# Patient Record
Sex: MALE | Race: WHITE | NOT HISPANIC OR LATINO | Employment: OTHER | URBAN - NONMETROPOLITAN AREA
[De-identification: names, ages, dates, MRNs, and addresses within clinical notes are randomized per-mention and may not be internally consistent; named-entity substitution may affect disease eponyms.]

---

## 2019-07-18 ENCOUNTER — OFFICE VISIT (OUTPATIENT)
Dept: NEUROSURGERY | Facility: CLINIC | Age: 74
End: 2019-07-18

## 2019-07-18 VITALS
BODY MASS INDEX: 25.31 KG/M2 | OXYGEN SATURATION: 98 % | WEIGHT: 180.8 LBS | DIASTOLIC BLOOD PRESSURE: 72 MMHG | TEMPERATURE: 97 F | RESPIRATION RATE: 18 BRPM | SYSTOLIC BLOOD PRESSURE: 147 MMHG | HEIGHT: 71 IN | HEART RATE: 54 BPM

## 2019-07-18 DIAGNOSIS — M51.36 DEGENERATIVE DISC DISEASE, LUMBAR: Primary | ICD-10-CM

## 2019-07-18 PROCEDURE — 99203 OFFICE O/P NEW LOW 30 MIN: CPT | Performed by: NEUROLOGICAL SURGERY

## 2019-07-18 RX ORDER — NABUMETONE 750 MG/1
750 TABLET, FILM COATED ORAL 2 TIMES DAILY
Qty: 60 TABLET | Refills: 0 | Status: SHIPPED | OUTPATIENT
Start: 2019-07-18 | End: 2022-08-09

## 2019-07-18 RX ORDER — GABAPENTIN 600 MG/1
TABLET ORAL
COMMUNITY
Start: 2019-06-18 | End: 2022-08-09

## 2019-07-18 NOTE — PROGRESS NOTES
Joni SEPULVEDA Lucia  1945  [id number]      Chief Complaint   Patient presents with   • Back Pain   • Leg Pain       HISTORY OF PRESENT ILLNESS: This is a 73-year-old male who presents with a chronic history of severe pain in his back which radiates into his lower extremities more so on the left than the right.  Pain is persistent.  He has not been to physical therapy.  In the past he has been to pain management; received injections which have not benefit.    A lumbar MRI was performed is referred for neurosurgical consultation.    Past Medical History:   Diagnosis Date   • Hypertension        Past Surgical History:   Procedure Laterality Date   • PROSTATE BIOPSY     • STOMACH SURGERY         Family History   Problem Relation Age of Onset   • Lung cancer Father        Social History     Socioeconomic History   • Marital status: Unknown     Spouse name: Not on file   • Number of children: Not on file   • Years of education: Not on file   • Highest education level: Not on file   Tobacco Use   • Smoking status: Current Every Day Smoker     Types: Cigarettes   • Smokeless tobacco: Never Used   Substance and Sexual Activity   • Alcohol use: Yes   • Drug use: No       Allergies   Allergen Reactions   • No Known Drug Allergy          Current Outpatient Medications:   •  allopurinol (ZYLOPRIM) 300 MG tablet, , Disp: , Rfl:   •  ALPRAZolam (XANAX) 0.5 MG tablet, Take one tablet 30 minutes prior to procedure - bring medication to the office day of procedure., Disp: 3 tablet, Rfl: 0  •  diazepam (VALIUM) 5 MG tablet, , Disp: , Rfl:   •  Hydrocodone-Acetaminophen (LORCET PLUS) 7.5-650 MG per tablet, Take  by mouth 3 (three) times a day., Disp: , Rfl:   •  lisinopril (PRINIVIL,ZESTRIL) 30 MG tablet, Take  by mouth daily., Disp: , Rfl:   •  metoprolol tartrate (LOPRESSOR) 50 MG tablet, , Disp: , Rfl:   •  nystatin-triamcinolone (MYCOLOG) 766684-2.1 UNIT/GM-% ointment, Apply  topically 2 (two) times a day., Disp: 30 g, Rfl: 0  •   ranitidine (ZANTAC) 150 MG tablet, , Disp: , Rfl:   •  zolpidem (AMBIEN) 5 MG tablet, Take  by mouth., Disp: , Rfl:     Current Facility-Administered Medications:   •  cefTRIAXone (ROCEPHIN) injection 1 g, 1 g, Intramuscular, Q24H, Talha Golden MD    Review of Systems   Constitutional: Negative for activity change, appetite change, chills, diaphoresis, fatigue, fever and unexpected weight change.   HENT: Positive for sinus pressure and trouble swallowing. Negative for congestion, dental problem, drooling, ear discharge, ear pain, facial swelling, hearing loss, mouth sores, nosebleeds, postnasal drip, rhinorrhea, sinus pain, sneezing, sore throat, tinnitus and voice change.    Eyes: Positive for itching. Negative for photophobia, pain, discharge and redness.   Respiratory: Negative for apnea, cough, choking, chest tightness, shortness of breath, wheezing and stridor.    Cardiovascular: Positive for palpitations. Negative for chest pain and leg swelling.   Gastrointestinal: Negative for abdominal distention, abdominal pain, anal bleeding, blood in stool, constipation, diarrhea, nausea, rectal pain and vomiting.   Endocrine: Negative for cold intolerance, heat intolerance, polydipsia, polyphagia and polyuria.   Genitourinary: Negative for decreased urine volume, difficulty urinating, discharge, dysuria, enuresis, flank pain, frequency, genital sores, hematuria, penile pain, penile swelling, scrotal swelling, testicular pain and urgency.   Musculoskeletal: Positive for arthralgias, back pain, gait problem, joint swelling, myalgias, neck pain and neck stiffness.   Skin: Negative for color change, pallor, rash and wound.   Allergic/Immunologic: Positive for environmental allergies. Negative for food allergies and immunocompromised state.   Neurological: Positive for numbness. Negative for dizziness, tremors, seizures, syncope, facial asymmetry, speech difficulty, weakness, light-headedness and headaches.  "  Hematological: Negative for adenopathy. Does not bruise/bleed easily.   Psychiatric/Behavioral: Negative for agitation, behavioral problems, confusion, decreased concentration, dysphoric mood, hallucinations, self-injury, sleep disturbance and suicidal ideas. The patient is not nervous/anxious and is not hyperactive.        Vitals:    07/18/19 1233   BP: 147/72   BP Location: Right arm   Patient Position: Sitting   Cuff Size: Adult   Pulse: 54   Resp: 18   Temp: 97 °F (36.1 °C)   TempSrc: Oral   SpO2: 98%   Weight: 82 kg (180 lb 12.8 oz)   Height: 180.3 cm (71\")       Neurological Examination:    Mental status/speech: The patient is alert and oriented.  Speech is clear without aphysia or dysarthria.  No overt cognitive deficits.    Cranial nerve examination:    Olfaction: Smell is intact.  Vision: Vision is intact without visual field abnormalities.  Funduscopic examination is normal.  No pupillary irregularity.  Ocular motor examination: The extraocular muscles are intact.  There is no diplopia.  The pupil is round and reactive to both light and accommodation.  There is no nystagmus.  Facial movement/sensation: There is no facial weakness.  Sensation is intact in the first, second, and third divisions of the trigeminal nerve.  The corneal reflex is intact.  Auditory: Hearing is intact to finger rub bilaterally.  Cranial nerves IX, X, XI, XII: Phonation is normal.  No dysphagia.  Tongue is protruded in the midline without atrophy.  The gag reflex is intact.  Shoulder shrug is normal.    Musculoligamentous ligamentous examination: He has limitation range of motion lumbar spine however straight leg raising Lasègue and flip test are all negative.  I find no evidence of weakness sensory loss or reflex asymmetry.  His gait is normal.          Medical Decision Making:     Diagnostic Data Set: I reviewed his lumbar MRI which shows the presence of degenerative osteoarthritic just.  These are chronic and show no evidence " of high-grade spinal or foraminal stenosis.      Assessment: Degenerative osteoarthritis of the lumbar spine          Recommendations: He does not have a condition that can be helped with surgical intervention.  Pain management is his best option.  He has been to [facility] which did not help or provide medication.  I am not familiar with local pain management services other than those that do the injections.        I greatly appreciate the opportunity to see and evaluate this individual.  If you have questions or concerns regarding issues that I may have overlooked please call me at any time: [phone].  Franc Cervantes M.D.  [facility]  [address] [place] [address]  Scribed for Joni Cervantes MD by Koby Duarte CMA. 7/18/2019 12:44 PM     I have read and concur with the information provided by the scribe.  Joni Cervantes MD

## 2020-09-01 ENCOUNTER — HOSPITAL ENCOUNTER (OUTPATIENT)
Dept: GENERAL RADIOLOGY | Facility: HOSPITAL | Age: 75
Discharge: HOME | End: 2020-09-01
Admitting: FAMILY MEDICINE

## 2020-09-01 ENCOUNTER — TRANSCRIBE ORDERS (OUTPATIENT)
Dept: ADMINISTRATIVE | Facility: HOSPITAL | Age: 75
End: 2020-09-01

## 2020-09-01 DIAGNOSIS — M25.522 LEFT ELBOW PAIN: ICD-10-CM

## 2020-09-01 DIAGNOSIS — M25.512 LEFT SHOULDER PAIN, UNSPECIFIED CHRONICITY: Primary | ICD-10-CM

## 2020-09-01 DIAGNOSIS — M25.512 LEFT SHOULDER PAIN, UNSPECIFIED CHRONICITY: ICD-10-CM

## 2020-09-01 PROCEDURE — 73080 X-RAY EXAM OF ELBOW: CPT | Performed by: RADIOLOGY

## 2020-09-01 PROCEDURE — 73080 X-RAY EXAM OF ELBOW: CPT

## 2020-09-01 PROCEDURE — 73030 X-RAY EXAM OF SHOULDER: CPT | Performed by: RADIOLOGY

## 2020-09-01 PROCEDURE — 73030 X-RAY EXAM OF SHOULDER: CPT

## 2020-10-21 ENCOUNTER — TRANSCRIBE ORDERS (OUTPATIENT)
Dept: ADMINISTRATIVE | Facility: HOSPITAL | Age: 75
End: 2020-10-21

## 2020-10-21 DIAGNOSIS — R13.10 PROBLEMS WITH SWALLOWING AND MASTICATION: Primary | ICD-10-CM

## 2020-10-23 ENCOUNTER — TRANSCRIBE ORDERS (OUTPATIENT)
Dept: ADMINISTRATIVE | Facility: HOSPITAL | Age: 75
End: 2020-10-23

## 2020-10-23 DIAGNOSIS — Z01.818 PRE-OPERATIVE CLEARANCE: Primary | ICD-10-CM

## 2020-10-27 ENCOUNTER — LAB (OUTPATIENT)
Dept: LAB | Facility: HOSPITAL | Age: 75
End: 2020-10-27

## 2020-10-27 DIAGNOSIS — Z01.818 PRE-OPERATIVE CLEARANCE: ICD-10-CM

## 2020-10-27 PROCEDURE — C9803 HOPD COVID-19 SPEC COLLECT: HCPCS

## 2020-10-27 PROCEDURE — U0004 COV-19 TEST NON-CDC HGH THRU: HCPCS | Performed by: RADIOLOGY

## 2020-10-28 LAB — SARS-COV-2 RNA RESP QL NAA+PROBE: NOT DETECTED

## 2020-10-29 ENCOUNTER — HOSPITAL ENCOUNTER (OUTPATIENT)
Dept: GENERAL RADIOLOGY | Facility: HOSPITAL | Age: 75
Discharge: HOME | End: 2020-10-29
Admitting: NURSE PRACTITIONER

## 2020-10-29 DIAGNOSIS — R13.10 PROBLEMS WITH SWALLOWING AND MASTICATION: ICD-10-CM

## 2020-10-29 PROCEDURE — 74220 X-RAY XM ESOPHAGUS 1CNTRST: CPT

## 2020-10-29 PROCEDURE — 74220 X-RAY XM ESOPHAGUS 1CNTRST: CPT | Performed by: RADIOLOGY

## 2021-01-06 ENCOUNTER — TRANSCRIBE ORDERS (OUTPATIENT)
Dept: ADMINISTRATIVE | Facility: HOSPITAL | Age: 76
End: 2021-01-06

## 2021-01-06 DIAGNOSIS — Z01.818 PRE-OPERATIVE CLEARANCE: Primary | ICD-10-CM

## 2021-01-18 ENCOUNTER — TRANSCRIBE ORDERS (OUTPATIENT)
Dept: ADMINISTRATIVE | Facility: HOSPITAL | Age: 76
End: 2021-01-18

## 2021-01-18 ENCOUNTER — HOSPITAL ENCOUNTER (OUTPATIENT)
Dept: GENERAL RADIOLOGY | Facility: HOSPITAL | Age: 76
Discharge: HOME | End: 2021-01-18
Admitting: PHYSICIAN ASSISTANT

## 2021-01-18 DIAGNOSIS — M79.672 LEFT FOOT PAIN: ICD-10-CM

## 2021-01-18 DIAGNOSIS — M79.671 RIGHT FOOT PAIN: Primary | ICD-10-CM

## 2021-01-18 DIAGNOSIS — M79.671 RIGHT FOOT PAIN: ICD-10-CM

## 2021-01-18 PROCEDURE — 73630 X-RAY EXAM OF FOOT: CPT | Performed by: RADIOLOGY

## 2021-01-18 PROCEDURE — 73630 X-RAY EXAM OF FOOT: CPT

## 2021-02-25 ENCOUNTER — TRANSCRIBE ORDERS (OUTPATIENT)
Dept: ADMINISTRATIVE | Facility: HOSPITAL | Age: 76
End: 2021-02-25

## 2021-02-25 DIAGNOSIS — Z01.818 OTHER SPECIFIED PRE-OPERATIVE EXAMINATION: Primary | ICD-10-CM

## 2021-02-26 ENCOUNTER — LAB (OUTPATIENT)
Dept: LAB | Facility: HOSPITAL | Age: 76
End: 2021-02-26

## 2021-02-26 DIAGNOSIS — Z01.818 OTHER SPECIFIED PRE-OPERATIVE EXAMINATION: ICD-10-CM

## 2021-02-26 PROCEDURE — C9803 HOPD COVID-19 SPEC COLLECT: HCPCS

## 2021-02-26 PROCEDURE — U0004 COV-19 TEST NON-CDC HGH THRU: HCPCS

## 2021-02-27 LAB — SARS-COV-2 RNA RESP QL NAA+PROBE: NOT DETECTED

## 2021-08-18 ENCOUNTER — HOSPITAL ENCOUNTER (OUTPATIENT)
Dept: GENERAL RADIOLOGY | Facility: HOSPITAL | Age: 76
Discharge: HOME | End: 2021-08-18
Admitting: FAMILY MEDICINE

## 2021-08-18 ENCOUNTER — TRANSCRIBE ORDERS (OUTPATIENT)
Dept: ADMINISTRATIVE | Facility: HOSPITAL | Age: 76
End: 2021-08-18

## 2021-08-18 DIAGNOSIS — G62.9 PERIPHERAL NEURITIS: ICD-10-CM

## 2021-08-18 DIAGNOSIS — M54.50 LOW BACK PAIN, UNSPECIFIED BACK PAIN LATERALITY, UNSPECIFIED CHRONICITY, UNSPECIFIED WHETHER SCIATICA PRESENT: ICD-10-CM

## 2021-08-18 DIAGNOSIS — G89.29 OTHER CHRONIC PAIN: ICD-10-CM

## 2021-08-18 DIAGNOSIS — G62.9 PERIPHERAL NEURITIS: Primary | ICD-10-CM

## 2021-08-18 PROCEDURE — 72100 X-RAY EXAM L-S SPINE 2/3 VWS: CPT | Performed by: RADIOLOGY

## 2021-08-18 PROCEDURE — 72100 X-RAY EXAM L-S SPINE 2/3 VWS: CPT

## 2021-08-18 PROCEDURE — 72050 X-RAY EXAM NECK SPINE 4/5VWS: CPT | Performed by: RADIOLOGY

## 2021-08-18 PROCEDURE — 72072 X-RAY EXAM THORAC SPINE 3VWS: CPT

## 2021-08-18 PROCEDURE — 73590 X-RAY EXAM OF LOWER LEG: CPT

## 2021-08-18 PROCEDURE — 73630 X-RAY EXAM OF FOOT: CPT

## 2021-08-18 PROCEDURE — 73590 X-RAY EXAM OF LOWER LEG: CPT | Performed by: RADIOLOGY

## 2021-08-18 PROCEDURE — 72072 X-RAY EXAM THORAC SPINE 3VWS: CPT | Performed by: RADIOLOGY

## 2021-08-18 PROCEDURE — 73630 X-RAY EXAM OF FOOT: CPT | Performed by: RADIOLOGY

## 2021-08-18 PROCEDURE — 72050 X-RAY EXAM NECK SPINE 4/5VWS: CPT

## 2021-10-07 ENCOUNTER — TRANSCRIBE ORDERS (OUTPATIENT)
Dept: ADMINISTRATIVE | Facility: HOSPITAL | Age: 76
End: 2021-10-07

## 2021-10-07 DIAGNOSIS — Z01.818 PRE-OPERATIVE CLEARANCE: Primary | ICD-10-CM

## 2021-10-11 ENCOUNTER — LAB (OUTPATIENT)
Dept: LAB | Facility: HOSPITAL | Age: 76
End: 2021-10-11

## 2021-10-11 DIAGNOSIS — Z01.818 PRE-OPERATIVE CLEARANCE: ICD-10-CM

## 2021-10-11 PROCEDURE — U0004 COV-19 TEST NON-CDC HGH THRU: HCPCS

## 2021-10-11 PROCEDURE — C9803 HOPD COVID-19 SPEC COLLECT: HCPCS

## 2021-10-12 LAB — SARS-COV-2 RNA PNL SPEC NAA+PROBE: NOT DETECTED

## 2021-10-22 ENCOUNTER — TRANSCRIBE ORDERS (OUTPATIENT)
Dept: ADMINISTRATIVE | Facility: HOSPITAL | Age: 76
End: 2021-10-22

## 2021-10-22 DIAGNOSIS — Z01.818 PRE-OPERATIVE CLEARANCE: Primary | ICD-10-CM

## 2021-10-25 ENCOUNTER — LAB (OUTPATIENT)
Dept: LAB | Facility: HOSPITAL | Age: 76
End: 2021-10-25

## 2021-10-25 DIAGNOSIS — Z01.818 PRE-OPERATIVE CLEARANCE: ICD-10-CM

## 2021-10-25 LAB — SARS-COV-2 RNA PNL SPEC NAA+PROBE: NOT DETECTED

## 2021-10-25 PROCEDURE — C9803 HOPD COVID-19 SPEC COLLECT: HCPCS

## 2021-10-25 PROCEDURE — U0004 COV-19 TEST NON-CDC HGH THRU: HCPCS | Performed by: OPHTHALMOLOGY

## 2021-11-10 ENCOUNTER — TRANSCRIBE ORDERS (OUTPATIENT)
Dept: ADMINISTRATIVE | Facility: HOSPITAL | Age: 76
End: 2021-11-10

## 2021-11-10 DIAGNOSIS — Z01.818 PRE-OPERATIVE CLEARANCE: Primary | ICD-10-CM

## 2022-01-26 ENCOUNTER — TRANSCRIBE ORDERS (OUTPATIENT)
Dept: ADMINISTRATIVE | Facility: HOSPITAL | Age: 77
End: 2022-01-26

## 2022-01-26 DIAGNOSIS — M54.9 OTHER ACUTE BACK PAIN: Primary | ICD-10-CM

## 2022-01-26 DIAGNOSIS — G89.29 OTHER CHRONIC PAIN: ICD-10-CM

## 2022-02-09 ENCOUNTER — HOSPITAL ENCOUNTER (OUTPATIENT)
Dept: MRI IMAGING | Facility: HOSPITAL | Age: 77
Discharge: HOME | End: 2022-02-09
Admitting: FAMILY MEDICINE

## 2022-02-09 DIAGNOSIS — G89.29 OTHER CHRONIC PAIN: ICD-10-CM

## 2022-02-09 DIAGNOSIS — M54.9 OTHER ACUTE BACK PAIN: ICD-10-CM

## 2022-02-09 PROCEDURE — 72148 MRI LUMBAR SPINE W/O DYE: CPT | Performed by: RADIOLOGY

## 2022-02-09 PROCEDURE — 72148 MRI LUMBAR SPINE W/O DYE: CPT

## 2022-04-01 ENCOUNTER — TRANSCRIBE ORDERS (OUTPATIENT)
Dept: ADMINISTRATIVE | Facility: HOSPITAL | Age: 77
End: 2022-04-01

## 2022-04-01 ENCOUNTER — HOSPITAL ENCOUNTER (OUTPATIENT)
Dept: GENERAL RADIOLOGY | Facility: HOSPITAL | Age: 77
Discharge: HOME | End: 2022-04-01
Admitting: FAMILY MEDICINE

## 2022-04-01 DIAGNOSIS — M25.551 RIGHT HIP PAIN: Primary | ICD-10-CM

## 2022-04-01 DIAGNOSIS — M25.551 RIGHT HIP PAIN: ICD-10-CM

## 2022-04-01 PROCEDURE — 73502 X-RAY EXAM HIP UNI 2-3 VIEWS: CPT

## 2022-04-01 PROCEDURE — 73502 X-RAY EXAM HIP UNI 2-3 VIEWS: CPT | Performed by: RADIOLOGY

## 2022-08-09 ENCOUNTER — OFFICE VISIT (OUTPATIENT)
Dept: NEUROSURGERY | Facility: CLINIC | Age: 77
End: 2022-08-09

## 2022-08-09 VITALS
HEART RATE: 55 BPM | BODY MASS INDEX: 22.32 KG/M2 | DIASTOLIC BLOOD PRESSURE: 74 MMHG | SYSTOLIC BLOOD PRESSURE: 167 MMHG | HEIGHT: 72 IN | RESPIRATION RATE: 16 BRPM | WEIGHT: 164.8 LBS

## 2022-08-09 DIAGNOSIS — R20.2 BILATERAL LEG PARESTHESIA: ICD-10-CM

## 2022-08-09 DIAGNOSIS — G89.29 CHRONIC BILATERAL LOW BACK PAIN WITHOUT SCIATICA: Primary | ICD-10-CM

## 2022-08-09 DIAGNOSIS — M54.50 CHRONIC BILATERAL LOW BACK PAIN WITHOUT SCIATICA: Primary | ICD-10-CM

## 2022-08-09 PROCEDURE — 99204 OFFICE O/P NEW MOD 45 MIN: CPT | Performed by: PHYSICIAN ASSISTANT

## 2022-08-09 RX ORDER — TAMSULOSIN HYDROCHLORIDE 0.4 MG/1
CAPSULE ORAL
COMMUNITY
Start: 2022-03-09

## 2022-08-09 RX ORDER — PANTOPRAZOLE SODIUM 40 MG/1
TABLET, DELAYED RELEASE ORAL
COMMUNITY
Start: 2021-11-10

## 2022-08-09 RX ORDER — VENLAFAXINE 75 MG/1
TABLET ORAL
COMMUNITY
Start: 2022-07-22

## 2022-08-09 RX ORDER — GABAPENTIN 800 MG/1
TABLET ORAL
COMMUNITY
Start: 2022-07-22

## 2022-08-09 RX ORDER — LINACLOTIDE 72 UG/1
CAPSULE, GELATIN COATED ORAL
COMMUNITY
Start: 2022-06-22

## 2022-08-09 RX ORDER — BACLOFEN 10 MG/1
TABLET ORAL
COMMUNITY
Start: 2022-06-22

## 2022-08-09 RX ORDER — HYDROCODONE BITARTRATE AND ACETAMINOPHEN 10; 325 MG/1; MG/1
TABLET ORAL
COMMUNITY
Start: 2022-07-22

## 2022-08-09 RX ORDER — ZOLPIDEM TARTRATE 10 MG/1
TABLET ORAL
COMMUNITY
Start: 2022-07-29

## 2022-08-09 NOTE — PROGRESS NOTES
Patient: Joni Myers  : 1945  Gender: male    Primary Care Provider: Hernan Landeros MD    Requesting Provider:  Hernan Landeros MD  [address]  [facility] [address]  [place] [id number]     Chief Complaint: Low back and bilateral leg pain    History of Present Illness:  This is a 76-year-old gentleman who is seen today for evaluation of low back and bilateral leg pain.  He has seen Dr. Cervantes for similar symptoms in 2019.  Patient notes a longstanding history of pain in his low back as well as into his lower extremities.  He states that his pain is diffuse into his legs bilaterally.  He cannot determine if one side is worse than the other.  He denies any focal weakness or bowel or bladder dysfunction.  He has been through physical therapy in the past which typically makes pain worse.  He notes multiple instances over the years where he is fallen off of elevated surfaces during work which may have precluded his back symptoms.  He is seen today with a lumbar MRI.    Patient's medical history significant for hypertension and current tobacco use.      Past Medical and Surgical History:  Past Medical History:   Diagnosis Date   • Hypertension    • Low back pain      Past Surgical History:   Procedure Laterality Date   • PROSTATE BIOPSY     • STOMACH SURGERY         Current Medications:    Current Outpatient Medications:   •  apixaban (ELIQUIS) 5 MG tablet tablet, Eliquis 5 mg tablet  TAKE 1 TABLET BY MOUTH TWICE A DAY BY ORAL ROUTE FOR 30 DAYS., Disp: , Rfl:   •  baclofen (LIORESAL) 10 MG tablet, , Disp: , Rfl:   •  gabapentin (NEURONTIN) 800 MG tablet, , Disp: , Rfl:   •  Hydrocodone-Acetaminophen (LORCET PLUS) 7.5-650 MG per tablet, Take  by mouth 3 (three) times a day., Disp: , Rfl:   •  HYDROcodone-acetaminophen (NORCO)  MG per tablet, , Disp: , Rfl:   •  Linzess 72 MCG capsule capsule, , Disp: , Rfl:   •  metoprolol tartrate (LOPRESSOR) 25 MG tablet, metoprolol tartrate 25 mg tablet  TAKE  1 TABLET (25 MG) BY ORAL ROUTE 2 TIMES PER DAY FOR 30 DAYS *STOP METOPROLOL 50MG*, Disp: , Rfl:   •  pantoprazole (PROTONIX) 40 MG EC tablet, pantoprazole 40 mg tablet,delayed release  take 1 tablet (40 mg) by oral route once daily for 30 days, Disp: , Rfl:   •  tamsulosin (FLOMAX) 0.4 MG capsule 24 hr capsule, tamsulosin 0.4 mg capsule  TAKE 2 CAPSULES BY ORAL ROUTE DAILY FOR 30 DAYS, Disp: , Rfl:   •  venlafaxine (EFFEXOR) 75 MG tablet, , Disp: , Rfl:   •  zolpidem (AMBIEN) 10 MG tablet, , Disp: , Rfl:   •  zolpidem (AMBIEN) 5 MG tablet, Take  by mouth., Disp: , Rfl:     Current Facility-Administered Medications:   •  cefTRIAXone (ROCEPHIN) injection 1 g, 1 g, Intramuscular, Q24H, Talha Golden MD    Allergies:  Allergies   Allergen Reactions   • Aspirin Unknown - High Severity         Review of Systems   Musculoskeletal: Positive for back pain.         Physical Exam  Constitutional:       Appearance: Normal appearance.   HENT:      Head: Normocephalic and atraumatic.   Musculoskeletal:         General: Normal range of motion.      Cervical back: Normal range of motion and neck supple.   Skin:     General: Skin is warm and dry.   Neurological:      Mental Status: He is alert and oriented to person, place, and time.      Sensory: Sensation is intact.      Motor: Motor function is intact.      Coordination: Coordination is intact.      Gait: Gait is intact.      Deep Tendon Reflexes:      Reflex Scores:       Bicep reflexes are 1+ on the right side and 1+ on the left side.       Brachioradialis reflexes are 1+ on the right side and 1+ on the left side.       Patellar reflexes are 2+ on the right side and 2+ on the left side.       Achilles reflexes are 1+ on the right side and 1+ on the left side.     Comments: Cony sign is negative bilaterally  No clonus elicited at the ankle  His gait is mildly antalgic   Psychiatric:         Mood and Affect: Mood normal.         Behavior: Behavior normal.  "          Vitals:    08/09/22 1404   BP: 167/74   BP Location: Left arm   Patient Position: Sitting   Cuff Size: Adult   Pulse: 55   Resp: 16   Weight: 74.8 kg (164 lb 12.8 oz)   Height: 181.6 cm (71.5\")       BMI is within normal parameters. No other follow-up for BMI required.    Independent Review of Diagnostic Imaging:  MRI of the lumbar spine performed 2/9/2022 demonstrates a chronic compression deformity of L1.  There is diffuse disc degeneration as well as facet arthropathy.  There is high-grade foraminal stenosis at multiple levels there is no high-grade central canal stenosis observed.    Assessment:  1.  Chronic low back pain  2.  Bilateral leg pain    Plan:  This is a 76-year-old gentleman who is seen today for evaluation of low back and bilateral leg pain.  His MRI demonstrates diffuse disc degeneration and facet arthropathy.  There is no high-grade canal stenosis however there are several levels of foraminal narrowing.  Historically he has not done very well with physical therapy.  I am going to send him to pain management for consideration of lumbar injections.  I explained to the patient that there is really nothing focal to jump to surgically at this time.  I will see him back in a couple of months after injections.        Gabi Dejesus PA-C  "

## 2023-02-06 ENCOUNTER — TRANSCRIBE ORDERS (OUTPATIENT)
Dept: ADMINISTRATIVE | Facility: HOSPITAL | Age: 78
End: 2023-02-06
Payer: MEDICARE

## 2023-02-06 DIAGNOSIS — M25.562 LEFT KNEE PAIN, UNSPECIFIED CHRONICITY: Primary | ICD-10-CM

## 2023-03-08 ENCOUNTER — HOSPITAL ENCOUNTER (OUTPATIENT)
Dept: MRI IMAGING | Facility: HOSPITAL | Age: 78
Discharge: HOME | End: 2023-03-08
Payer: MEDICARE

## 2023-03-08 ENCOUNTER — HOSPITAL ENCOUNTER (OUTPATIENT)
Dept: GENERAL RADIOLOGY | Facility: HOSPITAL | Age: 78
Discharge: HOME | End: 2023-03-08
Payer: MEDICARE

## 2023-03-08 ENCOUNTER — TRANSCRIBE ORDERS (OUTPATIENT)
Dept: ADMINISTRATIVE | Facility: HOSPITAL | Age: 78
End: 2023-03-08
Payer: MEDICARE

## 2023-03-08 DIAGNOSIS — M25.562 LEFT KNEE PAIN, UNSPECIFIED CHRONICITY: ICD-10-CM

## 2023-03-08 DIAGNOSIS — M25.562 LEFT KNEE PAIN, UNSPECIFIED CHRONICITY: Primary | ICD-10-CM

## 2023-03-08 PROCEDURE — 73560 X-RAY EXAM OF KNEE 1 OR 2: CPT

## 2023-03-08 PROCEDURE — 73721 MRI JNT OF LWR EXTRE W/O DYE: CPT

## 2023-03-08 PROCEDURE — 73721 MRI JNT OF LWR EXTRE W/O DYE: CPT | Performed by: RADIOLOGY

## 2023-03-08 PROCEDURE — 73560 X-RAY EXAM OF KNEE 1 OR 2: CPT | Performed by: RADIOLOGY

## 2024-04-02 ENCOUNTER — TRANSCRIBE ORDERS (OUTPATIENT)
Dept: ADMINISTRATIVE | Facility: HOSPITAL | Age: 79
End: 2024-04-02
Payer: MEDICARE

## 2024-04-02 DIAGNOSIS — M25.561 RIGHT KNEE PAIN, UNSPECIFIED CHRONICITY: Primary | ICD-10-CM

## 2024-04-02 DIAGNOSIS — M25.562 LEFT KNEE PAIN, UNSPECIFIED CHRONICITY: ICD-10-CM

## 2024-04-04 ENCOUNTER — HOSPITAL ENCOUNTER (OUTPATIENT)
Dept: GENERAL RADIOLOGY | Facility: HOSPITAL | Age: 79
Discharge: HOME | End: 2024-04-04
Payer: MEDICARE

## 2024-04-04 ENCOUNTER — TRANSCRIBE ORDERS (OUTPATIENT)
Dept: ADMINISTRATIVE | Facility: HOSPITAL | Age: 79
End: 2024-04-04
Payer: MEDICARE

## 2024-04-04 DIAGNOSIS — M25.562 LEFT KNEE PAIN, UNSPECIFIED CHRONICITY: ICD-10-CM

## 2024-04-04 DIAGNOSIS — M25.561 RIGHT KNEE PAIN, UNSPECIFIED CHRONICITY: ICD-10-CM

## 2024-04-04 DIAGNOSIS — M25.561 RIGHT KNEE PAIN, UNSPECIFIED CHRONICITY: Primary | ICD-10-CM

## 2024-04-04 PROCEDURE — 73562 X-RAY EXAM OF KNEE 3: CPT

## 2024-06-07 ENCOUNTER — OFFICE VISIT (OUTPATIENT)
Dept: UROLOGY | Facility: CLINIC | Age: 79
End: 2024-06-07
Payer: MEDICARE

## 2024-06-07 VITALS
SYSTOLIC BLOOD PRESSURE: 142 MMHG | HEART RATE: 68 BPM | WEIGHT: 154 LBS | HEIGHT: 71 IN | BODY MASS INDEX: 21.56 KG/M2 | DIASTOLIC BLOOD PRESSURE: 72 MMHG

## 2024-06-07 DIAGNOSIS — R97.20 ELEVATED PROSTATE SPECIFIC ANTIGEN (PSA): Primary | ICD-10-CM

## 2024-06-07 DIAGNOSIS — R30.0 DYSURIA: ICD-10-CM

## 2024-06-07 LAB
BILIRUB BLD-MCNC: NEGATIVE MG/DL
BUN SERPL-MCNC: 9 MG/DL (ref 8–23)
BUN/CREAT SERPL: 10.7 (ref 7–25)
CALCIUM SERPL-MCNC: 9.3 MG/DL (ref 8.6–10.5)
CHLORIDE SERPL-SCNC: 103 MMOL/L (ref 98–107)
CLARITY, POC: CLEAR
CO2 SERPL-SCNC: 25.8 MMOL/L (ref 22–29)
COLOR UR: YELLOW
CREAT SERPL-MCNC: 0.84 MG/DL (ref 0.76–1.27)
EGFRCR SERPLBLD CKD-EPI 2021: 89.8 ML/MIN/1.73
EXPIRATION DATE: ABNORMAL
GLUCOSE SERPL-MCNC: 96 MG/DL (ref 65–99)
GLUCOSE UR STRIP-MCNC: NEGATIVE MG/DL
KETONES UR QL: NEGATIVE
LEUKOCYTE EST, POC: NEGATIVE
Lab: ABNORMAL
NITRITE UR-MCNC: NEGATIVE MG/ML
PH UR: 6 [PH] (ref 5–8)
POTASSIUM SERPL-SCNC: 4.1 MMOL/L (ref 3.5–5.2)
PROT UR STRIP-MCNC: NEGATIVE MG/DL
RBC # UR STRIP: ABNORMAL /UL
SODIUM SERPL-SCNC: 140 MMOL/L (ref 136–145)
SP GR UR: 1.02 (ref 1–1.03)
UROBILINOGEN UR QL: NORMAL

## 2024-06-07 RX ORDER — ALFUZOSIN HYDROCHLORIDE 10 MG/1
10 TABLET, EXTENDED RELEASE ORAL DAILY
Qty: 30 TABLET | Refills: 5 | Status: SHIPPED | OUTPATIENT
Start: 2024-06-07

## 2024-06-07 RX ORDER — SULFAMETHOXAZOLE AND TRIMETHOPRIM 800; 160 MG/1; MG/1
1 TABLET ORAL EVERY 12 HOURS
Qty: 28 TABLET | Refills: 0 | Status: SHIPPED | OUTPATIENT
Start: 2024-06-07

## 2024-06-07 NOTE — PROGRESS NOTES
"Chief Complaint:    Chief Complaint   Patient presents with    Elevated PSA       Vital Signs:   /72 (BP Location: Left arm, Patient Position: Sitting)   Pulse 68   Ht 181.6 cm (71.5\")   Wt 69.9 kg (154 lb)   BMI 21.18 kg/m²   Body mass index is 21.18 kg/m².      HPI:  Joni Myers is a 78 y.o. male who presents today for initial evaluation     History of Present Illness  Mr. Myers presents to the clinic for concerns of elevated PSA.  He has been referred to us by THADDEUS Anand.  He does note the patient has been seen previously in office by Dr. Golden for elevated PSA and lower urinary tract symptoms.  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  Rest of samples were benign prostatic tissue only.  Patient was scheduled to follow-up in 3 months however never kept appointment.  He has been on Flomax since with minimal improvement in lower urinary tract symptoms.  He currently has an IPSS score of 18.  He gets up 2 times throughout the night endorses a weak stream, urgency, intermittency, and sensations of incomplete bladder emptying.  He denies any frequency or straining to begin with urination.  He does endorse intermittent dysuria and perineum pain.  He states the symptoms have been intermittently over the past month.  States this is worse with that to be such as riding his lawnmower.  He has tried warm soaks with some improvement.  He had a recent PSA taken by his primary care provider on April 23, 2024 that came back severely elevated at 16.  His free percentage was roughly 6.  This gave him a 50% chance for prostate carcinoma.  He denies any prostate biopsy since 2016.  He denies any family history of known prostate cancer.      Past Medical History:  Past Medical History:   Diagnosis Date    Hypertension     Low back pain  "       Current Meds:  Current Outpatient Medications   Medication Sig Dispense Refill    apixaban (ELIQUIS) 5 MG tablet tablet Eliquis 5 mg tablet   TAKE 1 TABLET BY MOUTH TWICE A DAY BY ORAL ROUTE FOR 30 DAYS.      gabapentin (NEURONTIN) 800 MG tablet       HYDROcodone-acetaminophen (NORCO)  MG per tablet       Linzess 72 MCG capsule capsule       metoprolol tartrate (LOPRESSOR) 25 MG tablet metoprolol tartrate 25 mg tablet   TAKE 1 TABLET (25 MG) BY ORAL ROUTE 2 TIMES PER DAY FOR 30 DAYS *STOP METOPROLOL 50MG*      pantoprazole (PROTONIX) 40 MG EC tablet pantoprazole 40 mg tablet,delayed release   take 1 tablet (40 mg) by oral route once daily for 30 days      venlafaxine (EFFEXOR) 75 MG tablet       zolpidem (AMBIEN) 10 MG tablet       alfuzosin (UROXATRAL) 10 MG 24 hr tablet Take 1 tablet by mouth Daily. 30 tablet 5    baclofen (LIORESAL) 10 MG tablet  (Patient not taking: Reported on 6/7/2024)      Hydrocodone-Acetaminophen (LORCET PLUS) 7.5-650 MG per tablet Take  by mouth 3 (three) times a day. (Patient not taking: Reported on 6/7/2024)      sulfamethoxazole-trimethoprim (Bactrim DS) 800-160 MG per tablet Take 1 tablet by mouth Every 12 (Twelve) Hours. 28 tablet 0    zolpidem (AMBIEN) 5 MG tablet Take  by mouth. (Patient not taking: Reported on 6/7/2024)       Current Facility-Administered Medications   Medication Dose Route Frequency Provider Last Rate Last Admin    cefTRIAXone (ROCEPHIN) injection 1 g  1 g Intramuscular Q24H Talha Golden MD            Allergies:   Allergies   Allergen Reactions    Aspirin Unknown - High Severity        Past Surgical History:  Past Surgical History:   Procedure Laterality Date    PROSTATE BIOPSY      STOMACH SURGERY         Social History:  Social History     Socioeconomic History    Marital status:    Tobacco Use    Smoking status: Every Day     Types: Cigarettes    Smokeless tobacco: Never   Vaping Use    Vaping status: Never Used   Substance and  Sexual Activity    Alcohol use: Yes    Drug use: No       Family History:  Family History   Problem Relation Age of Onset    Lung cancer Father        Review of Systems:  Review of Systems   Constitutional:  Negative for chills, fatigue, fever and unexpected weight change.   Respiratory:  Negative for cough, chest tightness, shortness of breath and wheezing.    Cardiovascular:  Negative for chest pain and leg swelling.   Gastrointestinal:  Negative for abdominal pain, constipation, diarrhea, nausea and vomiting.   Genitourinary:  Positive for difficulty urinating, dysuria, frequency and urgency. Negative for penile pain, penile swelling, scrotal swelling and testicular pain.   Musculoskeletal:  Negative for back pain and joint swelling.   Skin:  Negative for rash.   Neurological:  Negative for dizziness and headaches.   Psychiatric/Behavioral:  Negative for confusion and suicidal ideas.        Physical Exam:  Physical Exam  Constitutional:       General: He is not in acute distress.     Appearance: Normal appearance.   HENT:      Head: Normocephalic and atraumatic.      Nose: Nose normal.      Mouth/Throat:      Mouth: Mucous membranes are moist.   Eyes:      Conjunctiva/sclera: Conjunctivae normal.   Cardiovascular:      Rate and Rhythm: Normal rate and regular rhythm.      Pulses: Normal pulses.      Heart sounds: Normal heart sounds.   Pulmonary:      Effort: Pulmonary effort is normal.      Breath sounds: Normal breath sounds.   Abdominal:      General: Bowel sounds are normal.      Palpations: Abdomen is soft.   Genitourinary:     Comments: Enlarged prostate with slight hardness to palpation of the right side of prostate.  Normal left prostate.  No significant nodules noted.  Musculoskeletal:         General: Normal range of motion.      Cervical back: Normal range of motion.   Skin:     General: Skin is warm.   Neurological:      General: No focal deficit present.      Mental Status: He is alert and oriented  to person, place, and time.   Psychiatric:         Mood and Affect: Mood normal.         Behavior: Behavior normal.         Thought Content: Thought content normal.         Judgment: Judgment normal.         IPSS Questionnaire (AUA-7):  IPSS Questionnaire (AUA-7):                  IPSS Questionnaire (AUA-7):  Over the past month…    1)  Incomplete Emptying  How often have you had a sensation of not emptying your bladder?  3 - About half the time   2)  Frequency  How often have you had to urinate less than every two hours? 0 - Not at all   3)  Intermittency  How often have you found you stopped and started again several times when you urinated?  5 - Almost always   4) Urgency  How often have you found it difficult to postpone urination?  5 - Almost always   5) Weak Stream  How often have you had a weak urinary stream?  3 - About half the time   6) Straining  How often have you had to push or strain to begin urination?  0 - Not at all   7) Nocturia  How many times did you typically get up at night to urinate?  2 - 2 times   Total Score:  18   The International Prostate Symptom Score (IPSS) is used to screen, diagnose, track symptoms of benign prostatic hyperplasia (BPH).    0-7 pts (Mild Symptoms)  / 8-19 pts (Moderate) / 20-35 (Severe)    Quality of life due to urinary symptoms:  If you were to spend the rest of your life with your urinary condition the way it is now, how would you feel about that? 5-Unhappy   Urine Leakage (Incontinence) 0-No Leakage       Recent Image (CT and/or KUB):   CT Abdomen and Pelvis: No results found for this or any previous visit.     CT Stone Protocol: No results found for this or any previous visit.     KUB: No results found for this or any previous visit.       Labs:  Brief Urine Lab Results  (Last result in the past 365 days)        Color   Clarity   Blood   Leuk Est   Nitrite   Protein   CREAT   Urine HCG        06/07/24 1009 Yellow   Clear   3+   Negative   Negative   Negative                  Office Visit on 06/07/2024   Component Date Value Ref Range Status    Color 06/07/2024 Yellow  Yellow, Straw, Dark Yellow, Veena Final    Clarity, UA 06/07/2024 Clear  Clear Final    Specific Gravity  06/07/2024 1.020  1.005 - 1.030 Final    pH, Urine 06/07/2024 6.0  5.0 - 8.0 Final    Leukocytes 06/07/2024 Negative  Negative Final    Nitrite, UA 06/07/2024 Negative  Negative Final    Protein, POC 06/07/2024 Negative  Negative mg/dL Final    Glucose, UA 06/07/2024 Negative  Negative mg/dL Final    Ketones, UA 06/07/2024 Negative  Negative Final    Urobilinogen, UA 06/07/2024 Normal  Normal, 0.2 E.U./dL Final    Bilirubin 06/07/2024 Negative  Negative Final    Blood, UA 06/07/2024 3+ (A)  Negative Final    Lot Number 06/07/2024 [id number]   Final    Expiration Date 06/07/2024 102,024   Final        Procedure: None  Procedures     Assessment/Plan:   Problem List Items Addressed This Visit       Elevated prostate specific antigen (PSA) - Primary    Relevant Medications    sulfamethoxazole-trimethoprim (Bactrim DS) 800-160 MG per tablet     Other Visit Diagnoses       Dysuria        Relevant Medications    alfuzosin (UROXATRAL) 10 MG 24 hr tablet    sulfamethoxazole-trimethoprim (Bactrim DS) 800-160 MG per tablet    Other Relevant Orders    POC Urinalysis Dipstick, Automated (Completed)    Bladder Scan (Completed)    Basic Metabolic Panel            Health Maintenance:   Health Maintenance Due   Topic Date Due    URINE MICROALBUMIN  Never done    Pneumococcal Vaccine 65+ (1 of 2 - PCV) Never done    DIABETIC EYE EXAM  Never done    ZOSTER VACCINE (1 of 2) Never done    RSV Vaccine - Adults (1 - 1-dose 60+ series) Never done    TDAP/TD VACCINES (1 - Tdap) 06/11/2010    HEPATITIS C SCREENING  Never done    ANNUAL WELLNESS VISIT  Never done    HEMOGLOBIN A1C  Never done    COVID-19 Vaccine (4 - 2023-24 season) 09/01/2023        Smoking Counseling: Everyday smoker.  Never used smokeless tobacco.   Counseling    Urine Incontinence: Patient reports that he is not currently experiencing any symptoms of urinary incontinence.    Patient was given instructions and counseling regarding his condition or for health maintenance advice. Please see specific information pulled into the AVS if appropriate.    Patient Education:   Elevated PSA/enlarged prostate -discussed with the patient the pathophysiology of this condition in detail.  Discussed the forms of treatment for enlarged prostate which can include medications and surgical interventions.  Patient has been on Flomax for several years with minimal improvement in lower urinary tract symptoms.  At this time recommend to switch to alfuzosin 10 mg once every 24 hours.  Discussed the risk and benefits of alpha blockade with the patient.  Advised patient to discontinue medication if he begins to experience lightheadedness, dizziness, syncope, blurry vision, chest pain, or shortness of breath.  I did discuss with the use of 5 alpha reductase inhibitors however discussed the increased risk for prostate cancer with these medications.  Given patient's current lower urinary tract symptoms and concerns for acute prostatitis I will start the patient on Bactrim twice daily once every 12 hours.  Discussed the risk and benefits of self antibiotics in detail with the patient.  I will check a BMP to monitor for kidney function at this time.  I will call patient with results once available.  Educated patient to use warm soaks and compresses twice daily to help with inflammation and pain.  Did advise patient given previous history of high-grade intraepithelial neoplasia increased risk for prostate carcinoma.  Did discuss further workup including a repeat biopsy versus MRI.  Patient wishes to hold off on biopsy and MRI at this time.  I will place him back in 2 weeks for reevaluation and repeat PSA.  He verbalized understanding.    Visit Diagnoses:    ICD-10-CM ICD-9-CM   1. Elevated  prostate specific antigen (PSA)  R97.20 790.93   2. Dysuria  R30.0 788.1       Meds Ordered During Visit:  New Medications Ordered This Visit   Medications    alfuzosin (UROXATRAL) 10 MG 24 hr tablet     Sig: Take 1 tablet by mouth Daily.     Dispense:  30 tablet     Refill:  5    sulfamethoxazole-trimethoprim (Bactrim DS) 800-160 MG per tablet     Sig: Take 1 tablet by mouth Every 12 (Twelve) Hours.     Dispense:  28 tablet     Refill:  0       Follow Up Appointment: 2 weeks  No follow-ups on file.      This document has been electronically signed by Nitin Cervantes PA-C   June 7, 2024 12:00 EDT    Part of this note may be an electronic transcription/translation of spoken language to printed text using the Dragon Dictation System.

## 2024-06-10 ENCOUNTER — TELEPHONE (OUTPATIENT)
Dept: UROLOGY | Facility: CLINIC | Age: 79
End: 2024-06-10
Payer: MEDICARE

## 2024-06-10 NOTE — TELEPHONE ENCOUNTER
Tried to call patient about BMP results however he did not answer.  Left a voicemail advising results came back well within normal limits and recommended to continue with antibiotics.  Advised to call back with questions or concerns

## 2024-06-21 ENCOUNTER — OFFICE VISIT (OUTPATIENT)
Dept: UROLOGY | Facility: CLINIC | Age: 79
End: 2024-06-21
Payer: MEDICARE

## 2024-06-21 VITALS
HEART RATE: 79 BPM | DIASTOLIC BLOOD PRESSURE: 75 MMHG | WEIGHT: 157.2 LBS | HEIGHT: 71 IN | BODY MASS INDEX: 22.01 KG/M2 | SYSTOLIC BLOOD PRESSURE: 115 MMHG

## 2024-06-21 DIAGNOSIS — R97.20 ELEVATED PROSTATE SPECIFIC ANTIGEN (PSA): Primary | ICD-10-CM

## 2024-06-21 NOTE — PROGRESS NOTES
"Chief Complaint:    Chief Complaint   Patient presents with    Elevated PSA     2 week follow up       Vital Signs:   /75 (BP Location: Left arm, Patient Position: Sitting, Cuff Size: Adult)   Pulse 79   Ht 181.6 cm (71.5\")   Wt 71.3 kg (157 lb 3.2 oz)   BMI 21.62 kg/m²   Body mass index is 21.62 kg/m².      HPI:  Joni Myers is a 78 y.o. male who presents today for follow up    History of Present Illness  Mr. Myers presents to the clinic for follow-up for elevated PSA.  He has a past medical history significant for enlarged prostate and elevated PSA.  He has been on Flomax previously with minimal improvement.  He was seen roughly 2 weeks ago for an elevated PSA of 16 taken by his primary care provider.  He had a free percentage of 6.  He does have a known prostate biopsy completed by Dr. Golden several years ago that showed no concerns of malignancy.  There was high-grade prostatic intraepithelial neoplasia.  At last office visit patient was complaining of perineum pain, dysuria, frequency, urgency, and low back pain.  We did start him on a short 2-week course of Bactrim twice daily for possible acute prostatitis.  He returns today with some improvement in symptoms.  States he has been taking alfuzosin as prescribed with some improvement in dysuria and difficulty urinating.  He has no other complaints at this time.      Past Medical History:  Past Medical History:   Diagnosis Date    Hypertension     Low back pain        Current Meds:  Current Outpatient Medications   Medication Sig Dispense Refill    alfuzosin (UROXATRAL) 10 MG 24 hr tablet Take 1 tablet by mouth Daily. 30 tablet 5    apixaban (ELIQUIS) 5 MG tablet tablet Eliquis 5 mg tablet   TAKE 1 TABLET BY MOUTH TWICE A DAY BY ORAL ROUTE FOR 30 DAYS.      gabapentin (NEURONTIN) 800 MG tablet       HYDROcodone-acetaminophen (NORCO)  MG per tablet       Linzess 72 MCG capsule capsule       metoprolol tartrate (LOPRESSOR) 25 MG tablet " metoprolol tartrate 25 mg tablet   TAKE 1 TABLET (25 MG) BY ORAL ROUTE 2 TIMES PER DAY FOR 30 DAYS *STOP METOPROLOL 50MG*      pantoprazole (PROTONIX) 40 MG EC tablet pantoprazole 40 mg tablet,delayed release   take 1 tablet (40 mg) by oral route once daily for 30 days      venlafaxine (EFFEXOR) 75 MG tablet       zolpidem (AMBIEN) 10 MG tablet       baclofen (LIORESAL) 10 MG tablet  (Patient not taking: Reported on 6/7/2024)      Hydrocodone-Acetaminophen (LORCET PLUS) 7.5-650 MG per tablet Take  by mouth 3 (three) times a day. (Patient not taking: Reported on 6/7/2024)      sulfamethoxazole-trimethoprim (Bactrim DS) 800-160 MG per tablet Take 1 tablet by mouth Every 12 (Twelve) Hours. (Patient not taking: Reported on 6/21/2024) 28 tablet 0    zolpidem (AMBIEN) 5 MG tablet Take  by mouth. (Patient not taking: Reported on 6/7/2024)       Current Facility-Administered Medications   Medication Dose Route Frequency Provider Last Rate Last Admin    cefTRIAXone (ROCEPHIN) injection 1 g  1 g Intramuscular Q24H Talha Golden MD            Allergies:   Allergies   Allergen Reactions    Aspirin Unknown - High Severity        Past Surgical History:  Past Surgical History:   Procedure Laterality Date    PROSTATE BIOPSY      STOMACH SURGERY         Social History:  Social History     Socioeconomic History    Marital status:    Tobacco Use    Smoking status: Every Day     Types: Cigarettes    Smokeless tobacco: Never   Vaping Use    Vaping status: Never Used   Substance and Sexual Activity    Alcohol use: Yes    Drug use: No       Family History:  Family History   Problem Relation Age of Onset    Lung cancer Father        Review of Systems:  Review of Systems   Constitutional:  Negative for chills, fatigue, fever and unexpected weight change.   Respiratory:  Negative for cough, chest tightness, shortness of breath and wheezing.    Cardiovascular:  Negative for chest pain and leg swelling.   Gastrointestinal:   Negative for abdominal pain, constipation, diarrhea, nausea and vomiting.   Genitourinary:  Positive for difficulty urinating, dysuria, frequency and urgency. Negative for penile pain, penile swelling, scrotal swelling and testicular pain.   Musculoskeletal:  Negative for back pain and joint swelling.   Skin:  Negative for rash.   Neurological:  Negative for dizziness and headaches.   Psychiatric/Behavioral:  Negative for confusion and suicidal ideas.        Physical Exam:  Physical Exam  Constitutional:       General: He is not in acute distress.     Appearance: Normal appearance.   HENT:      Head: Normocephalic and atraumatic.      Nose: Nose normal.      Mouth/Throat:      Mouth: Mucous membranes are moist.   Eyes:      Conjunctiva/sclera: Conjunctivae normal.   Cardiovascular:      Rate and Rhythm: Normal rate and regular rhythm.      Pulses: Normal pulses.      Heart sounds: Normal heart sounds.   Pulmonary:      Effort: Pulmonary effort is normal.      Breath sounds: Normal breath sounds.   Abdominal:      General: Bowel sounds are normal.      Palpations: Abdomen is soft.   Musculoskeletal:         General: Normal range of motion.      Cervical back: Normal range of motion.   Skin:     General: Skin is warm.   Neurological:      General: No focal deficit present.      Mental Status: He is alert and oriented to person, place, and time.   Psychiatric:         Mood and Affect: Mood normal.         Behavior: Behavior normal.         Thought Content: Thought content normal.         Judgment: Judgment normal.           Recent Image (CT and/or KUB):   CT Abdomen and Pelvis: No results found for this or any previous visit.     CT Stone Protocol: No results found for this or any previous visit.     KUB: No results found for this or any previous visit.       Labs:  Brief Urine Lab Results  (Last result in the past 365 days)        Color   Clarity   Blood   Leuk Est   Nitrite   Protein   CREAT   Urine HCG         06/07/24 1009 Yellow   Clear   3+   Negative   Negative   Negative                 Office Visit on 06/07/2024   Component Date Value Ref Range Status    Color 06/07/2024 Yellow  Yellow, Straw, Dark Yellow, Veena Final    Clarity, UA 06/07/2024 Clear  Clear Final    Specific Gravity  06/07/2024 1.020  1.005 - 1.030 Final    pH, Urine 06/07/2024 6.0  5.0 - 8.0 Final    Leukocytes 06/07/2024 Negative  Negative Final    Nitrite, UA 06/07/2024 Negative  Negative Final    Protein, POC 06/07/2024 Negative  Negative mg/dL Final    Glucose, UA 06/07/2024 Negative  Negative mg/dL Final    Ketones, UA 06/07/2024 Negative  Negative Final    Urobilinogen, UA 06/07/2024 Normal  Normal, 0.2 E.U./dL Final    Bilirubin 06/07/2024 Negative  Negative Final    Blood, UA 06/07/2024 3+ (A)  Negative Final    Lot Number 06/07/2024 [id number]   Final    Expiration Date 06/07/2024 102,024   Final    Glucose 06/07/2024 96  65 - 99 mg/dL Final    BUN 06/07/2024 9  8 - 23 mg/dL Final    Creatinine 06/07/2024 0.84  0.76 - 1.27 mg/dL Final    EGFR Result 06/07/2024 89.8  >60.0 mL/min/1.73 Final    Comment: GFR Normal >60  Chronic Kidney Disease <60  Kidney Failure <15  The GFR formula is only valid for adults with stable renal  function between ages 18 and 70.      BUN/Creatinine Ratio 06/07/2024 10.7  7.0 - 25.0 Final    Sodium 06/07/2024 140  136 - 145 mmol/L Final    Potassium 06/07/2024 4.1  3.5 - 5.2 mmol/L Final    Chloride 06/07/2024 103  98 - 107 mmol/L Final    Total CO2 06/07/2024 25.8  22.0 - 29.0 mmol/L Final    Calcium 06/07/2024 9.3  8.6 - 10.5 mg/dL Final        Procedure: None  Procedures     I have reviewed and agree with the above PMH, PSH, FMH, social history, medications, allergies, and labs.     Assessment/Plan:   Problem List Items Addressed This Visit       Elevated prostate specific antigen (PSA) - Primary    Relevant Orders    PSA Total+% Free (Serial)       Health Maintenance:   Health Maintenance Due   Topic Date  Due    URINE MICROALBUMIN  Never done    Pneumococcal Vaccine 65+ (1 of 2 - PCV) Never done    DIABETIC EYE EXAM  Never done    ZOSTER VACCINE (1 of 2) Never done    RSV Vaccine - Adults (1 - 1-dose 60+ series) Never done    TDAP/TD VACCINES (1 - Tdap) 06/11/2010    HEPATITIS C SCREENING  Never done    ANNUAL WELLNESS VISIT  Never done    HEMOGLOBIN A1C  Never done    COVID-19 Vaccine (4 - 2023-24 season) 09/01/2023        Smoking Counseling: Everyday smoker.  Never used smokeless tobacco.  Counseling given.    Urine Incontinence: Patient reports that he is not currently experiencing any symptoms of urinary incontinence.    Patient was given instructions and counseling regarding his condition or for health maintenance advice. Please see specific information pulled into the AVS if appropriate.    Patient Education:   Elevated PSA/enlarged prostate -discussed with the patient the pathophysiology of this condition in detail.  Advised patient he has a serially elevated PSA of 16 with a low free percentage.  Advised him this does correlate to a 50% risk for prostate carcinoma.  Patient has had previous biopsy with no concerns of malignancy however high-grade prostatic intraepithelial neoplasia was noted.  Educated patient this is a precursor for prostate cancer.  At this time recommending repeat PSA free and total in office.  Advised patient if PSA is still elevated we will proceed forward with an office biopsy.  Discussed the nature of this procedure in detail including risk and benefits.  Patient verbalized understanding.    Visit Diagnoses:    ICD-10-CM ICD-9-CM   1. Elevated prostate specific antigen (PSA)  R97.20 790.93     A total of 20 minutes were spent coordinating this patient’s care in clinic today; 10 minutes of which were face-to-face with the patient, reviewing medical history and counseling on the current treatment and followup plan.  All questions were answered to patient's satisfaction.    Meds Ordered  During Visit:  No orders of the defined types were placed in this encounter.      Follow Up Appointment: Pending PSA  No follow-ups on file.      This document has been electronically signed by Nitin Cervantes PA-C   June 21, 2024 12:29 EDT    Part of this note may be an electronic transcription/translation of spoken language to printed text using the Dragon Dictation System.

## 2024-06-24 ENCOUNTER — TELEPHONE (OUTPATIENT)
Dept: UROLOGY | Facility: CLINIC | Age: 79
End: 2024-06-24
Payer: MEDICARE

## 2024-06-24 DIAGNOSIS — R97.20 ELEVATED PROSTATE SPECIFIC ANTIGEN (PSA): Primary | ICD-10-CM

## 2024-06-24 LAB
PSA FREE MFR SERPL: 7.3 %
PSA FREE SERPL-MCNC: 1.27 NG/ML
PSA SERPL-MCNC: 17.5 NG/ML (ref 0–4)

## 2024-06-24 RX ORDER — CIPROFLOXACIN 500 MG/1
TABLET, FILM COATED ORAL
Qty: 4 TABLET | Refills: 0 | Status: SHIPPED | OUTPATIENT
Start: 2024-06-24

## 2024-06-24 RX ORDER — DIAZEPAM 10 MG/1
TABLET ORAL
Qty: 2 TABLET | Refills: 0 | Status: SHIPPED | OUTPATIENT
Start: 2024-06-24

## 2024-06-24 RX ORDER — CLINDAMYCIN HYDROCHLORIDE 300 MG/1
300 CAPSULE ORAL 2 TIMES DAILY
Qty: 6 CAPSULE | Refills: 0 | Status: SHIPPED | OUTPATIENT
Start: 2024-06-24 | End: 2024-06-27

## 2024-06-24 RX ORDER — SODIUM PHOSPHATE,MONO-DIBASIC 19G-7G/118
ENEMA (ML) RECTAL
Qty: 1 ENEMA | Refills: 0 | Status: SHIPPED | OUTPATIENT
Start: 2024-06-24

## 2024-06-24 NOTE — TELEPHONE ENCOUNTER
Called patient advised him PSA was still significantly elevated with a low free percentage.  Given his previous biopsy with concerns of precancerous markers I did recommend to repeat a PSA in office.  I will send in appropriate medications and get him scheduled to soon as possible.  He verbalized understanding and agreed to plan of care.

## 2024-07-10 ENCOUNTER — TELEPHONE (OUTPATIENT)
Dept: UROLOGY | Facility: CLINIC | Age: 79
End: 2024-07-10
Payer: MEDICARE

## 2024-07-10 NOTE — TELEPHONE ENCOUNTER
PA for Alfuzosin has been sent to pt's insurance company and is available without a PA at this time.                                 Additional Information Required  Available without authorization.  Drug  Alfuzosin HCl ER 10MG er tablets  Form  [employer] Electronic PA Form  Original Claim Info  [id number] Provide Notice: Medicare Prescription Drug Coverage and Your Rights

## 2024-08-05 ENCOUNTER — PROCEDURE VISIT (OUTPATIENT)
Dept: UROLOGY | Facility: CLINIC | Age: 79
End: 2024-08-05
Payer: MEDICARE

## 2024-08-05 DIAGNOSIS — R97.20 ELEVATED PROSTATE SPECIFIC ANTIGEN (PSA): Primary | ICD-10-CM

## 2024-08-05 DIAGNOSIS — C61 PROSTATE CANCER: ICD-10-CM

## 2024-08-05 DIAGNOSIS — Z48.816 AFTERCARE FOLLOWING SURGERY OF THE GENITOURINARY SYSTEM: ICD-10-CM

## 2024-08-05 RX ORDER — GENTAMICIN 40 MG/ML
80 INJECTION, SOLUTION INTRAMUSCULAR; INTRAVENOUS ONCE
Status: COMPLETED | OUTPATIENT
Start: 2024-08-05 | End: 2024-08-05

## 2024-08-05 RX ADMIN — GENTAMICIN 80 MG: 40 INJECTION, SOLUTION INTRAMUSCULAR; INTRAVENOUS at 13:29

## 2024-08-05 NOTE — PROGRESS NOTES
Chief Complaint:      Chief Complaint   Patient presents with    Biopsy       HPI:   78 y.o. male here for biopsy.  Had a biopsy with Talha Golden in 2016 he has been off his Eliquis for 5 days.    Past Medical History:     Past Medical History:   Diagnosis Date    Hypertension     Low back pain        Current Meds:     Current Outpatient Medications   Medication Sig Dispense Refill    alfuzosin (UROXATRAL) 10 MG 24 hr tablet Take 1 tablet by mouth Daily. 30 tablet 5    apixaban (ELIQUIS) 5 MG tablet tablet Eliquis 5 mg tablet   TAKE 1 TABLET BY MOUTH TWICE A DAY BY ORAL ROUTE FOR 30 DAYS.      baclofen (LIORESAL) 10 MG tablet       ciprofloxacin (Cipro) 500 MG tablet Take one tablet twice a day before procedure 4 tablet 0    diazePAM (VALIUM) 10 MG tablet One tablet night before procedure at bedtime and one morning of one hour prior to procedure 2 tablet 0    gabapentin (NEURONTIN) 800 MG tablet       Hydrocodone-Acetaminophen (LORCET PLUS) 7.5-650 MG per tablet Take  by mouth 3 (Three) Times a Day.      HYDROcodone-acetaminophen (NORCO)  MG per tablet       Linzess 72 MCG capsule capsule       metoprolol tartrate (LOPRESSOR) 25 MG tablet metoprolol tartrate 25 mg tablet   TAKE 1 TABLET (25 MG) BY ORAL ROUTE 2 TIMES PER DAY FOR 30 DAYS *STOP METOPROLOL 50MG*      pantoprazole (PROTONIX) 40 MG EC tablet pantoprazole 40 mg tablet,delayed release   take 1 tablet (40 mg) by oral route once daily for 30 days      Sodium Phosphates ([employer] Enema Disposable) 19-7 GM/118ML enema Use morning of the procedure 1 enema 0    venlafaxine (EFFEXOR) 75 MG tablet       zolpidem (AMBIEN) 10 MG tablet       zolpidem (AMBIEN) 5 MG tablet Take  by mouth.       No current facility-administered medications for this visit.        Allergies:      Allergies   Allergen Reactions    Aspirin Unknown - High Severity        Past Surgical History:     Past Surgical History:   Procedure Laterality Date    PROSTATE BIOPSY      STOMACH SURGERY          Social History:     Social History     Socioeconomic History    Marital status:    Tobacco Use    Smoking status: Every Day     Types: Cigarettes    Smokeless tobacco: Never   Vaping Use    Vaping status: Never Used   Substance and Sexual Activity    Alcohol use: Yes    Drug use: No       Family History:     Family History   Problem Relation Age of Onset    Lung cancer Father        Review of Systems:     Review of Systems   Constitutional: Negative.    HENT: Negative.     Eyes: Negative.    Respiratory: Negative.     Cardiovascular: Negative.    Gastrointestinal: Negative.    Endocrine: Negative.    Musculoskeletal: Negative.    Allergic/Immunologic: Negative.    Neurological: Negative.    Hematological: Negative.    Psychiatric/Behavioral: Negative.         Physical Exam:     Physical Exam  Vitals and nursing note reviewed.   Constitutional:       Appearance: He is well-developed.   HENT:      Head: Normocephalic and atraumatic.   Eyes:      Conjunctiva/sclera: Conjunctivae normal.      Pupils: Pupils are equal, round, and reactive to light.   Cardiovascular:      Rate and Rhythm: Normal rate and regular rhythm.      Heart sounds: Normal heart sounds.   Pulmonary:      Effort: Pulmonary effort is normal.      Breath sounds: Normal breath sounds.   Abdominal:      General: Bowel sounds are normal.      Palpations: Abdomen is soft.   Musculoskeletal:         General: Normal range of motion.      Cervical back: Normal range of motion.   Skin:     General: Skin is warm and dry.   Neurological:      Mental Status: He is alert and oriented to person, place, and time.      Deep Tendon Reflexes: Reflexes are normal and symmetric.   Psychiatric:         Behavior: Behavior normal.         Thought Content: Thought content normal.         Judgment: Judgment normal.         I have reviewed the following portions of the patient's history: Allergies, current medications, past family history, past medical history,  past social history, past surgical history, problem list, and ROS and confirm it is accurate.    Recent Image (CT and/or KUB):      CT Abdomen and Pelvis: No results found for this or any previous visit.       CT Stone Protocol: No results found for this or any previous visit.       KUB: No results found for this or any previous visit.       Labs (past 3 months):      Office Visit on 06/21/2024   Component Date Value Ref Range Status    PSA 06/21/2024 17.5 (H)  0.0 - 4.0 ng/mL Final    Comment: Roche ECLIA methodology.  According to the American Urological Association, Serum PSA should  decrease and remain at undetectable levels after radical  prostatectomy. The AUA defines biochemical recurrence as an initial  PSA value 0.2 ng/mL or greater followed by a subsequent confirmatory  PSA value 0.2 ng/mL or greater.  Values obtained with different assay methods or kits cannot be used  interchangeably. Results cannot be interpreted as absolute evidence  of the presence or absence of malignant disease.      PSA, Free 06/21/2024 1.27  N/A ng/mL Final    Roche ECLIA methodology.    PSA, Free % 06/21/2024 7.3  % Final    Comment: The table below lists the probability of prostate cancer for  men with non-suspicious MARIANA results and total PSA between  4 and 10 ng/mL, by patient age (Freddy et al, JOSE 1998,  279:1542).                    % Free PSA       50-64 yr        65-75 yr                    0.00-10.00%        56%             55%                   10.01-15.00%        24%             35%                   15.01-20.00%        17%             23%                   20.01-25.00%        10%             20%                        >25.00%         5%              9%  Please note:  Freddy et al did not make specific                recommendations regarding the use of                percent free PSA for any other population                of men.     Office Visit on 06/07/2024   Component Date Value Ref Range Status    Color  06/07/2024 Yellow  Yellow, Straw, Dark Yellow, Veena Final    Clarity, UA 06/07/2024 Clear  Clear Final    Specific Gravity  06/07/2024 1.020  1.005 - 1.030 Final    pH, Urine 06/07/2024 6.0  5.0 - 8.0 Final    Leukocytes 06/07/2024 Negative  Negative Final    Nitrite, UA 06/07/2024 Negative  Negative Final    Protein, POC 06/07/2024 Negative  Negative mg/dL Final    Glucose, UA 06/07/2024 Negative  Negative mg/dL Final    Ketones, UA 06/07/2024 Negative  Negative Final    Urobilinogen, UA 06/07/2024 Normal  Normal, 0.2 E.U./dL Final    Bilirubin 06/07/2024 Negative  Negative Final    Blood, UA 06/07/2024 3+ (A)  Negative Final    Lot Number 06/07/2024 [id number]   Final    Expiration Date 06/07/2024 102,024   Final    Glucose 06/07/2024 96  65 - 99 mg/dL Final    BUN 06/07/2024 9  8 - 23 mg/dL Final    Creatinine 06/07/2024 0.84  0.76 - 1.27 mg/dL Final    EGFR Result 06/07/2024 89.8  >60.0 mL/min/1.73 Final    Comment: GFR Normal >60  Chronic Kidney Disease <60  Kidney Failure <15  The GFR formula is only valid for adults with stable renal  function between ages 18 and 70.      BUN/Creatinine Ratio 06/07/2024 10.7  7.0 - 25.0 Final    Sodium 06/07/2024 140  136 - 145 mmol/L Final    Potassium 06/07/2024 4.1  3.5 - 5.2 mmol/L Final    Chloride 06/07/2024 103  98 - 107 mmol/L Final    Total CO2 06/07/2024 25.8  22.0 - 29.0 mmol/L Final    Calcium 06/07/2024 9.3  8.6 - 10.5 mg/dL Final        Procedure:   Prostate ultrasound and biopsy:  78 year-old white male with a history of an elevated PSA and a significant increase in his risk for prostate cancer.  We discussed the prostate biopsy at length.  We discussed the significant risks of anesthesia, bleeding, infection, urosepsis, purported effects on erectile function, and rectal bleeding requiring surgical intervention.  He was given a pre-procedural enema.  He was pretreated with ciprofloxacin for 3 days.  He was given periprocedural gentamicin at the dose of 80  mg in an intramuscular fashion and given post biopsy clindamycin for 3 days.  Following an extensive informed consent, he was brought to the operative suite, placed in the left lateral decubitus position.  He was given his prophylactic gentamicin.  The rectum was anesthetized with 20 mL of 2% viscous Xylocaine jelly.  I then used the BK biplanar probe inserted into the rectum and made measurements of the prostate.  The height was 2.56 cm, the width was 4.69 cm, and the length was 3.90 cm for a volume of 24.35 g.  There were no obvious hypoechoic areas.  There was no intravesical median lobe.  There was minimal calcification between the transition and peripheral zone.  I then injected 20 mL of 1% Xylocaine in the perirectal area and raised of skin wheal to provide anesthesia after an adequate period of topical anesthesia. I used the Biopty gun to take a total of 10 cores without complication.  He tolerated this extremely well.  Cores.  There was no bleeding or complications.   Impression: Elevated PSA s/p biopsy.    Assessment/Plan:   Elevated prostate specific antigen-we discussed the diagnosis of elevated prostate-specific antigen.  I explained the pathophysiology of PSA.  It is a serine protease that's function in the male reproductive tract is to facilitate the liquefaction of semen.  It is for this reason the body does not want it freely floating in the serum and why typically bound tightly to albumin.  We discussed why we used both a PSA free and total to determine the need for more aggressive therapy. I discussed the normal range.  Additionally, it was in the range of 1 to 4, but more recently has been downgraded to something less than 2 or even approaching 1.  I discussed the risk of family history, particularly the fact that the average male has a 14% risk of prostate cancer and that in the face of a positive diagnosis in a father it will tablet and any other first-generation relative continued tablet  insofar that a father and brother with prostate cancer will produce almost a 50% risk of prostate cancer.  I discussed the use of the temporal use of PSA as the best option for monitoring.  We also discussed the fact that an elevated PSA is an isolated event does not mean that this is prostate cancer and should not engender worry in this regard. I discussed other things that can elevate PSA including constipation, prostatitis, infection, recent intercourse etc., as well as the risks and benefits associated with this.  Also discussed the fact that this is with a dilutional test and as a consequence of such were present produce slight variations on a single specimen.  Further discussed the risks and benefits of a prostate biopsy as well.        This document has been electronically signed by SONJA GAXIOLA MD August 5, 2024 12:47 EDT    Dictated Utilizing Dragon Dictation: Part of this note may be an electronic transcription/translation of spoken language to printed text using the Dragon Dictation System.

## 2024-08-07 LAB — REF LAB TEST METHOD: NORMAL

## 2024-08-13 ENCOUNTER — OFFICE VISIT (OUTPATIENT)
Dept: UROLOGY | Facility: CLINIC | Age: 79
End: 2024-08-13
Payer: MEDICARE

## 2024-08-13 VITALS
BODY MASS INDEX: 21.94 KG/M2 | DIASTOLIC BLOOD PRESSURE: 74 MMHG | HEIGHT: 72 IN | SYSTOLIC BLOOD PRESSURE: 124 MMHG | WEIGHT: 162 LBS | HEART RATE: 78 BPM

## 2024-08-13 DIAGNOSIS — C61 PROSTATE CANCER: Primary | ICD-10-CM

## 2024-08-13 PROCEDURE — 3074F SYST BP LT 130 MM HG: CPT | Performed by: UROLOGY

## 2024-08-13 PROCEDURE — 1160F RVW MEDS BY RX/DR IN RCRD: CPT | Performed by: UROLOGY

## 2024-08-13 PROCEDURE — 99213 OFFICE O/P EST LOW 20 MIN: CPT | Performed by: UROLOGY

## 2024-08-13 PROCEDURE — 3078F DIAST BP <80 MM HG: CPT | Performed by: UROLOGY

## 2024-08-13 PROCEDURE — 1159F MED LIST DOCD IN RCRD: CPT | Performed by: UROLOGY

## 2024-08-13 RX ORDER — BICALUTAMIDE 50 MG/1
50 TABLET, FILM COATED ORAL DAILY
Qty: 30 TABLET | Refills: 3 | Status: SHIPPED | OUTPATIENT
Start: 2024-08-13 | End: 2024-12-11

## 2024-08-13 NOTE — PROGRESS NOTES
Chief Complaint:      Chief Complaint   Patient presents with    Elevated PSA     PROSTATE BX RESULTS        HPI:   78 y.o. male status post a prostate ultrasound biopsy which was positive I will initiate a staging workup and I will plan to see him back in 1 month.  He had a group for biopsy and therefore I will not send for connect    Past Medical History:     Past Medical History:   Diagnosis Date    Hypertension     Low back pain        Current Meds:     Current Outpatient Medications   Medication Sig Dispense Refill    alfuzosin (UROXATRAL) 10 MG 24 hr tablet Take 1 tablet by mouth Daily. 30 tablet 5    apixaban (ELIQUIS) 5 MG tablet tablet Eliquis 5 mg tablet   TAKE 1 TABLET BY MOUTH TWICE A DAY BY ORAL ROUTE FOR 30 DAYS.      baclofen (LIORESAL) 10 MG tablet       gabapentin (NEURONTIN) 800 MG tablet       Hydrocodone-Acetaminophen (LORCET PLUS) 7.5-650 MG per tablet Take  by mouth 3 (Three) Times a Day.      HYDROcodone-acetaminophen (NORCO)  MG per tablet       Linzess 72 MCG capsule capsule       metoprolol tartrate (LOPRESSOR) 25 MG tablet metoprolol tartrate 25 mg tablet   TAKE 1 TABLET (25 MG) BY ORAL ROUTE 2 TIMES PER DAY FOR 30 DAYS *STOP METOPROLOL 50MG*      pantoprazole (PROTONIX) 40 MG EC tablet pantoprazole 40 mg tablet,delayed release   take 1 tablet (40 mg) by oral route once daily for 30 days      venlafaxine (EFFEXOR) 75 MG tablet       zolpidem (AMBIEN) 10 MG tablet       zolpidem (AMBIEN) 5 MG tablet Take  by mouth.       No current facility-administered medications for this visit.        Allergies:      Allergies   Allergen Reactions    Aspirin Unknown - High Severity        Past Surgical History:     Past Surgical History:   Procedure Laterality Date    PROSTATE BIOPSY      STOMACH SURGERY         Social History:     Social History     Socioeconomic History    Marital status:    Tobacco Use    Smoking status: Every Day     Types: Cigarettes    Smokeless tobacco: Never    Vaping Use    Vaping status: Never Used   Substance and Sexual Activity    Alcohol use: Yes    Drug use: No       Family History:     Family History   Problem Relation Age of Onset    Lung cancer Father        Review of Systems:     Review of Systems   Constitutional: Negative.    HENT: Negative.     Eyes: Negative.    Respiratory: Negative.     Cardiovascular: Negative.    Gastrointestinal: Negative.    Endocrine: Negative.    Musculoskeletal: Negative.    Allergic/Immunologic: Negative.    Neurological: Negative.    Hematological: Negative.    Psychiatric/Behavioral: Negative.         Physical Exam:     Physical Exam  Vitals and nursing note reviewed.   Constitutional:       Appearance: He is well-developed.   HENT:      Head: Normocephalic and atraumatic.   Eyes:      Conjunctiva/sclera: Conjunctivae normal.      Pupils: Pupils are equal, round, and reactive to light.   Cardiovascular:      Rate and Rhythm: Normal rate and regular rhythm.      Heart sounds: Normal heart sounds.   Pulmonary:      Effort: Pulmonary effort is normal.      Breath sounds: Normal breath sounds.   Abdominal:      General: Bowel sounds are normal.      Palpations: Abdomen is soft.   Musculoskeletal:         General: Normal range of motion.      Cervical back: Normal range of motion.   Skin:     General: Skin is warm and dry.   Neurological:      Mental Status: He is alert and oriented to person, place, and time.      Deep Tendon Reflexes: Reflexes are normal and symmetric.   Psychiatric:         Behavior: Behavior normal.         Thought Content: Thought content normal.         Judgment: Judgment normal.         I have reviewed the following portions of the patient's history: Allergies, current medications, past family history, past medical history, past social history, past surgical history, problem list, and ROS and confirm it is accurate.    Recent Image (CT and/or KUB):      CT Abdomen and Pelvis: No results found for this or any  previous visit.       CT Stone Protocol: No results found for this or any previous visit.       KUB: No results found for this or any previous visit.       Labs (past 3 months):      Procedure visit on 2024   Component Date Value Ref Range Status    Reference Lab Report 2024    Final                    Value:[facility]  [address] [place]  [address]  Phone: [phone] or [phone]  Fax: [phone]  Louis Jarvis M.D., Medical Director    PATIENT NAME                                     LABORATORY NO.  79JOI VALENZUELA                              [id number]  [id number]                                 AGE                    SEX   SSN              CLIENT REF #  [facility]              78        1945           [id number]      [id number]    [address] [facility] [address]                    REQUESTING M.D.           ATTENDING [place] [address]                           ROSARIO GAXIOLA SHEILA CHRISTOPHER  DATE COLLECTED            DATE RECEIVED          DATE REPORTED  2024    DIAGNOSIS:  A.     PROSTATE, NEEDLE CORE BIOPSY, RIGHT BASE:  Prostatic adenocarcinoma (Evelyn's grade 3+4), 80%  B.     PROSTATE, NEEDLE CORE                           BIOPSY, RIGHT LATERAL BASE:  Benign prostate tissue  C.     PROSTATE, NEEDLE CORE BIOPSY, RIGHT MID:  Prostatic adenocarcinoma (Evelyn's grade 4+3), 40%  D.     PROSTATE, NEEDLE CORE BIOPSY, RIGHT LATERAL MID:  Prostatic adenocarcinoma (Evelyn's grade 4+4), 5%  E.     PROSTATE, NEEDLE CORE BIOPSY, LEFT BASE:  Prostatic adenocarcinoma (Fred[place]'s grade 3+3), 40%  F.     PROSTATE, NEEDLE CORE BIOPSY, LEFT LATERAL BASE:  Prostatic adenocarcinoma (Fredonia's grade 3+4), 60%  G.     PROSTATE, NEEDLE CORE BIOPSY, LEFT MID:  Benign prostate tissue  H.     PROSTATE, NEEDLE CORE BIOPSY, LEFT LATERAL  "MID:  Benign prostate tissue                                                    RLL    COMMENT:  Representative tumor blocks: D & C    CLINICAL HISTORY:  Elevated prostate specific antigen (PSA)    SPECIMENS RECEIVED:  A.    PROSTATE, NEEDLE CORE BIOPSY, RIGHT BASE  B.    PROSTATE, NEEDLE CORE BIOPSY, RIGHT LATERAL BASE  C.    PROSTATE, NEEDLE CORE BIOPSY, RIGHT MID  D.    PROSTATE, NEEDLE CORE                           BIOPSY, RIGHT LATERAL MID  E.    PROSTATE, NEEDLE CORE BIOPSY, LEFT BASE  F.    PROSTATE, NEEDLE CORE BIOPSY, LEFT LATERAL BASE  G.    PROSTATE, NEEDLE CORE BIOPSY, LEFT MID  H.    PROSTATE, NEEDLE CORE BIOPSY, LEFT LATERAL MID    MICROSCOPIC DESCRIPTION:  Tissue blocks are prepared and slides are examined microscopically on all  specimens. See diagnosis for details.    Professional interpretation rendered by Louis Jarvis M.D., FEMILEEA.P. at  [facility], [address] [place] [address].    GROSS DESCRIPTION:  A.    Received in formalin labeled \"right base\" is a 1.5 cm long by 0.1 cm in  diameter tan needle core, all in A1  B.    Received in formalin labeled \"right lateral base\" is a 2 cm long by 0.1 cm  in diameter tan needle core, all in B1  C.    Received in formalin labeled \"right mid\" is a 1.6 cm long by 0.1 cm in  diameter tan needle core, all in C1  D.    Received in formalin labeled \"right lateral mid\" is a 1.5 cm long by 0.1 cm  in diameter tan needle core, all in                           D1  E.    Received in formalin labeled \"left base\" are 2 core fragments, 0.5 and 0.8  cm, submitted entirely in E1.  F.    Received in formalin labeled \"left lateral base\" is a 1.6 cm core submitted  entirely in F1.  G.    Received in formalin labeled \"left mid\" is a 1.7 cm core submitted entirely  in G1.  H.    Received in formalin labeled \"left lateral mid\" is a 1.7 cm core submitted  entirely in H1.  HDM    REVIEWED, DIAGNOSED AND ELECTRONICALLY  SIGNED BY:    Louis Jarvis M.D., " HERBERTH  CPT CODES:  88305x8     Office Visit on 06/21/2024   Component Date Value Ref Range Status    PSA 06/21/2024 17.5 (H)  0.0 - 4.0 ng/mL Final    Comment: Roche ECLIA methodology.  According to the American Urological Association, Serum PSA should  decrease and remain at undetectable levels after radical  prostatectomy. The AUA defines biochemical recurrence as an initial  PSA value 0.2 ng/mL or greater followed by a subsequent confirmatory  PSA value 0.2 ng/mL or greater.  Values obtained with different assay methods or kits cannot be used  interchangeably. Results cannot be interpreted as absolute evidence  of the presence or absence of malignant disease.      PSA, Free 06/21/2024 1.27  N/A ng/mL Final    Roche ECLIA methodology.    PSA, Free % 06/21/2024 7.3  % Final    Comment: The table below lists the probability of prostate cancer for  men with non-suspicious MARIANA results and total PSA between  4 and 10 ng/mL, by patient age (Freddy et al, JOSE 1998,  279:1542).                    % Free PSA       50-64 yr        65-75 yr                    0.00-10.00%        56%             55%                   10.01-15.00%        24%             35%                   15.01-20.00%        17%             23%                   20.01-25.00%        10%             20%                        >25.00%         5%              9%  Please note:  Freddy et al did not make specific                recommendations regarding the use of                percent free PSA for any other population                of men.     Office Visit on 06/07/2024   Component Date Value Ref Range Status    Color 06/07/2024 Yellow  Yellow, Straw, Dark Yellow, Veena Final    Clarity, UA 06/07/2024 Clear  Clear Final    Specific Gravity  06/07/2024 1.020  1.005 - 1.030 Final    pH, Urine 06/07/2024 6.0  5.0 - 8.0 Final    Leukocytes 06/07/2024 Negative  Negative Final    Nitrite, UA 06/07/2024 Negative  Negative Final    Protein, POC 06/07/2024  Negative  Negative mg/dL Final    Glucose, UA 06/07/2024 Negative  Negative mg/dL Final    Ketones, UA 06/07/2024 Negative  Negative Final    Urobilinogen, UA 06/07/2024 Normal  Normal, 0.2 E.U./dL Final    Bilirubin 06/07/2024 Negative  Negative Final    Blood, UA 06/07/2024 3+ (A)  Negative Final    Lot Number 06/07/2024 [id number]   Final    Expiration Date 06/07/2024 102,024   Final    Glucose 06/07/2024 96  65 - 99 mg/dL Final    BUN 06/07/2024 9  8 - 23 mg/dL Final    Creatinine 06/07/2024 0.84  0.76 - 1.27 mg/dL Final    EGFR Result 06/07/2024 89.8  >60.0 mL/min/1.73 Final    Comment: GFR Normal >60  Chronic Kidney Disease <60  Kidney Failure <15  The GFR formula is only valid for adults with stable renal  function between ages 18 and 70.      BUN/Creatinine Ratio 06/07/2024 10.7  7.0 - 25.0 Final    Sodium 06/07/2024 140  136 - 145 mmol/L Final    Potassium 06/07/2024 4.1  3.5 - 5.2 mmol/L Final    Chloride 06/07/2024 103  98 - 107 mmol/L Final    Total CO2 06/07/2024 25.8  22.0 - 29.0 mmol/L Final    Calcium 06/07/2024 9.3  8.6 - 10.5 mg/dL Final        Procedure:       Assessment/Plan:   Prostate cancer:  He returns today status post biopsy for extensive discussion of prostate cancer.  We discussed staging and grading of the disease.  I described with a Evelyn system being from a 2 to10 scale with the most common low-grade pattern being a [place] 6.  I discussed the staging workup including a total body bone scan and CT scan especially with a PSA greater than 10.  We discussed the various options at length. I discussed a radical retropubic prostatectomy done in the traditional fashion and using the robotic technique.  I then discussed radiation treatment both seed therapy and external beam therapy using Gold fiduciary markers.  We talked about some of the other alternatives such as a cryosurgical ablation at high intensity focused ultrasound as being viable alternatives but not recommended at this  time.  He focused on aggressive watchful waiting and explained that currently low literature it's been discovered that a lot of men can actually observe it especially with numerous other comorbidities cannot have problems from the cancer by itself.  Talked about hormonal ablation and the side effects of creation of insulin resistance.  Overall, the patient was given appropriate literature, is going to think about it, and I'm going to revisit the topic with them after the next office visit.  Initiate staging workup            This document has been electronically signed by SONJA GAXIOLA MD August 13, 2024 09:49 EDT    Dictated Utilizing Dragon Dictation: Part of this note may be an electronic transcription/translation of spoken language to printed text using the Dragon Dictation System.

## 2024-08-22 ENCOUNTER — HOSPITAL ENCOUNTER (OUTPATIENT)
Dept: NUCLEAR MEDICINE | Facility: HOSPITAL | Age: 79
Discharge: HOME | End: 2024-08-22
Payer: MEDICARE

## 2024-08-22 DIAGNOSIS — C61 PROSTATE CANCER: ICD-10-CM

## 2024-08-22 PROCEDURE — A9561 TC99M OXIDRONATE: HCPCS | Performed by: UROLOGY

## 2024-08-22 PROCEDURE — 0 TECHNETIUM OXIDRONATE KIT: Performed by: UROLOGY

## 2024-08-22 PROCEDURE — 78306 BONE IMAGING WHOLE BODY: CPT

## 2024-08-22 RX ADMIN — TECHNETIUM TC 99M OXIDRONATE 1 DOSE: 3.15 INJECTION, POWDER, LYOPHILIZED, FOR SOLUTION INTRAVENOUS at 10:05

## 2024-08-26 ENCOUNTER — HOSPITAL ENCOUNTER (OUTPATIENT)
Dept: CT IMAGING | Facility: HOSPITAL | Age: 79
Discharge: HOME | End: 2024-08-26
Admitting: UROLOGY
Payer: MEDICARE

## 2024-08-26 DIAGNOSIS — C61 PROSTATE CANCER: ICD-10-CM

## 2024-08-26 PROCEDURE — 74178 CT ABD&PLV WO CNTR FLWD CNTR: CPT

## 2024-08-26 PROCEDURE — 82565 ASSAY OF CREATININE: CPT

## 2024-08-26 PROCEDURE — 25510000001 IOPAMIDOL 61 % SOLUTION: Performed by: UROLOGY

## 2024-08-26 RX ORDER — IOPAMIDOL 612 MG/ML
100 INJECTION, SOLUTION INTRAVASCULAR
Status: COMPLETED | OUTPATIENT
Start: 2024-08-26 | End: 2024-08-26

## 2024-08-26 RX ADMIN — IOPAMIDOL 80 ML: 612 INJECTION, SOLUTION INTRAVENOUS at 10:48

## 2024-08-27 LAB — CREAT BLDA-MCNC: 1 MG/DL (ref 0.6–1.3)

## 2024-09-17 ENCOUNTER — OFFICE VISIT (OUTPATIENT)
Dept: UROLOGY | Facility: CLINIC | Age: 79
End: 2024-09-17
Payer: MEDICARE

## 2024-09-17 VITALS
HEART RATE: 61 BPM | BODY MASS INDEX: 21.7 KG/M2 | HEIGHT: 72 IN | DIASTOLIC BLOOD PRESSURE: 73 MMHG | WEIGHT: 160.2 LBS | SYSTOLIC BLOOD PRESSURE: 130 MMHG

## 2024-09-17 DIAGNOSIS — R97.20 ELEVATED PROSTATE SPECIFIC ANTIGEN (PSA): Primary | ICD-10-CM

## 2024-09-17 DIAGNOSIS — C61 PROSTATE CANCER: ICD-10-CM

## 2024-09-17 PROCEDURE — 3078F DIAST BP <80 MM HG: CPT | Performed by: UROLOGY

## 2024-09-17 PROCEDURE — 36415 COLL VENOUS BLD VENIPUNCTURE: CPT | Performed by: UROLOGY

## 2024-09-17 PROCEDURE — 3075F SYST BP GE 130 - 139MM HG: CPT | Performed by: UROLOGY

## 2024-09-17 PROCEDURE — 1160F RVW MEDS BY RX/DR IN RCRD: CPT | Performed by: UROLOGY

## 2024-09-17 PROCEDURE — 99213 OFFICE O/P EST LOW 20 MIN: CPT | Performed by: UROLOGY

## 2024-09-17 PROCEDURE — 1159F MED LIST DOCD IN RCRD: CPT | Performed by: UROLOGY

## 2024-09-18 ENCOUNTER — TELEPHONE (OUTPATIENT)
Dept: UROLOGY | Facility: CLINIC | Age: 79
End: 2024-09-18
Payer: MEDICARE

## 2024-09-18 LAB — PSA SERPL-MCNC: 5.74 NG/ML (ref 0–4)

## 2024-10-16 LAB — REF LAB TEST METHOD: NORMAL

## 2024-11-11 ENCOUNTER — OFFICE VISIT (OUTPATIENT)
Dept: UROLOGY | Facility: CLINIC | Age: 79
End: 2024-11-11
Payer: MEDICARE

## 2024-11-11 VITALS
BODY MASS INDEX: 21.61 KG/M2 | WEIGHT: 154.4 LBS | SYSTOLIC BLOOD PRESSURE: 152 MMHG | DIASTOLIC BLOOD PRESSURE: 84 MMHG | HEIGHT: 71 IN | HEART RATE: 83 BPM

## 2024-11-11 DIAGNOSIS — R97.20 ELEVATED PROSTATE SPECIFIC ANTIGEN (PSA): Primary | ICD-10-CM

## 2024-11-11 DIAGNOSIS — C61 PROSTATE CANCER: ICD-10-CM

## 2024-11-11 PROCEDURE — 99213 OFFICE O/P EST LOW 20 MIN: CPT | Performed by: UROLOGY

## 2024-11-11 PROCEDURE — 1160F RVW MEDS BY RX/DR IN RCRD: CPT | Performed by: UROLOGY

## 2024-11-11 PROCEDURE — 3079F DIAST BP 80-89 MM HG: CPT | Performed by: UROLOGY

## 2024-11-11 PROCEDURE — 1159F MED LIST DOCD IN RCRD: CPT | Performed by: UROLOGY

## 2024-11-11 PROCEDURE — 3077F SYST BP >= 140 MM HG: CPT | Performed by: UROLOGY

## 2024-11-11 NOTE — PROGRESS NOTES
Chief Complaint:      Chief Complaint   Patient presents with    Prostate Cancer       HPI:   78 y.o. male returns today as prostate cancer last PSA was 17 on 6/21/2024.  He has had a negative CT, negative total body bone scan, he had an Oncotype.  I am going to repeat his PSA and see him back in 1 month.  He reports no lower urinary tract symptomatology, particularly irritative symptoms such as frequency, urgency, dysuria, and obstructive symptomatology, particularly dribbling, hesitancy, and intermittency.     Past Medical History:     Past Medical History:   Diagnosis Date    Hypertension     Low back pain        Current Meds:     Current Outpatient Medications   Medication Sig Dispense Refill    alfuzosin (UROXATRAL) 10 MG 24 hr tablet Take 1 tablet by mouth Daily. 30 tablet 5    apixaban (ELIQUIS) 5 MG tablet tablet Eliquis 5 mg tablet   TAKE 1 TABLET BY MOUTH TWICE A DAY BY ORAL ROUTE FOR 30 DAYS.      baclofen (LIORESAL) 10 MG tablet       bicalutamide (Casodex) 50 MG tablet Take 1 tablet by mouth Daily for 120 days. 30 tablet 3    gabapentin (NEURONTIN) 800 MG tablet       Hydrocodone-Acetaminophen (LORCET PLUS) 7.5-650 MG per tablet Take  by mouth 3 (Three) Times a Day.      HYDROcodone-acetaminophen (NORCO)  MG per tablet       Linzess 72 MCG capsule capsule       metoprolol tartrate (LOPRESSOR) 25 MG tablet metoprolol tartrate 25 mg tablet   TAKE 1 TABLET (25 MG) BY ORAL ROUTE 2 TIMES PER DAY FOR 30 DAYS *STOP METOPROLOL 50MG*      pantoprazole (PROTONIX) 40 MG EC tablet pantoprazole 40 mg tablet,delayed release   take 1 tablet (40 mg) by oral route once daily for 30 days      venlafaxine (EFFEXOR) 75 MG tablet       zolpidem (AMBIEN) 10 MG tablet       zolpidem (AMBIEN) 5 MG tablet Take  by mouth.       No current facility-administered medications for this visit.        Allergies:      Allergies   Allergen Reactions    Aspirin Unknown - High Severity        Past Surgical History:     Past Surgical  History:   Procedure Laterality Date    PROSTATE BIOPSY      STOMACH SURGERY         Social History:     Social History     Socioeconomic History    Marital status:    Tobacco Use    Smoking status: Every Day     Types: Cigarettes    Smokeless tobacco: Never   Vaping Use    Vaping status: Never Used   Substance and Sexual Activity    Alcohol use: Yes    Drug use: No       Family History:     Family History   Problem Relation Age of Onset    Lung cancer Father        Review of Systems:     Review of Systems   Constitutional: Negative.    HENT: Negative.     Eyes: Negative.    Respiratory: Negative.     Cardiovascular: Negative.    Gastrointestinal: Negative.    Endocrine: Negative.    Musculoskeletal: Negative.    Allergic/Immunologic: Negative.    Neurological: Negative.    Hematological: Negative.    Psychiatric/Behavioral: Negative.         Physical Exam:     Physical Exam  Vitals and nursing note reviewed.   Constitutional:       Appearance: He is well-developed.   HENT:      Head: Normocephalic and atraumatic.   Eyes:      Conjunctiva/sclera: Conjunctivae normal.      Pupils: Pupils are equal, round, and reactive to light.   Cardiovascular:      Rate and Rhythm: Normal rate and regular rhythm.      Heart sounds: Normal heart sounds.   Pulmonary:      Effort: Pulmonary effort is normal.      Breath sounds: Normal breath sounds.   Abdominal:      General: Bowel sounds are normal.      Palpations: Abdomen is soft.   Musculoskeletal:         General: Normal range of motion.      Cervical back: Normal range of motion.   Skin:     General: Skin is warm and dry.   Neurological:      Mental Status: He is alert and oriented to person, place, and time.      Deep Tendon Reflexes: Reflexes are normal and symmetric.   Psychiatric:         Behavior: Behavior normal.         Thought Content: Thought content normal.         Judgment: Judgment normal.         I have reviewed the following portions of the patient's  history: Allergies, current medications, past family history, past medical history, past social history, past surgical history, problem list, and ROS and confirm it is accurate.    Recent Image (CT and/or KUB):      CT Abdomen and Pelvis: Results for orders placed during the hospital encounter of 08/26/24    CT Abdomen Pelvis With & Without Contrast    Narrative  PROCEDURE: CT ABDOMEN PELVIS W WO CONTRAST-    HISTORY: PROSTATE CANCER; A25-Iyxutozlk neoplasm of prostate    COMPARISON:  None .    TECHNIQUE: Multiple axial CT images were obtained from the lung bases  through the pubic symphysis before and following the administration of  Isovue-300. . This study was performed with techniques to keep radiation  doses as low as reasonably achievable (ALARA). Individualized dose  reduction techniques using automated exposure control or adjustment of  mA and/or kV according to the patient size were employed.    FINDINGS:    ABDOMEN: The lung bases are clear are clear. The heart is normal in  size. The liver is normal. The patient is status post cholecystectomy.  The spleen is unremarkable. No adrenal mass is present.  The pancreas is  unremarkable. Precontrast images reveal no evidence of nephrolithiasis.  The kidneys are normal. The aorta is normal in caliber. The mesenteric  vasculature is patent. There is no free fluid or adenopathy. The small  bowel is unremarkable with no evidence of obstruction.    PELVIS: The appendix is normal. There are colonic diverticula without  evidence of diverticulitis. The urinary bladder is collapsed which  limits evaluation. There is no significant free fluid or adenopathy.  Bone windows reveal no lytic or destructive lesions.    .    Impression  No evidence of metastatic disease within the abdomen or  pelvis.      This report was finalized on 8/26/2024 11:13 AM by Savage Gramajo M.D..       CT Stone Protocol: No results found for this or any previous visit.       KUB: No results  found for this or any previous visit.       Labs (past 3 months):      Office Visit on 09/17/2024   Component Date Value Ref Range Status    PSA 09/17/2024 5.740 (H)  0.000 - 4.000 ng/mL Final    Comment: Results may be falsely decreased if patient taking Biotin.  Testing Method: Roche [employer]ostics Electrochemiluminescence  Immunoassay(ECLIA)  Values obtained with different assay methods or kits cannot  be used interchangeably.     Hospital Outpatient Visit on 08/26/2024   Component Date Value Ref Range Status    Creatinine 08/26/2024 1.00  0.60 - 1.30 mg/dL Final    Serial Number: [id number]:  [id number]        Procedure:       Assessment/Plan:   Prostate cancer:  He returns today status post biopsy for extensive discussion of prostate cancer.  We discussed staging and grading of the disease.  I described with a Evelyn system being from a 2 to10 scale with the most common low-grade pattern being a Evelyn 6.  I discussed the staging workup including a total body bone scan and CT scan especially with a PSA greater than 10.  We discussed the various options at length. I discussed a radical retropubic prostatectomy done in the traditional fashion and using the robotic technique.  I then discussed radiation treatment both seed therapy and external beam therapy using Gold fiduciary markers.  We talked about some of the other alternatives such as a cryosurgical ablation at high intensity focused ultrasound as being viable alternatives but not recommended at this time.  He focused on aggressive watchful waiting and explained that currently low literature it's been discovered that a lot of men can actually observe it especially with numerous other comorbidities cannot have problems from the cancer by itself.  Talked about hormonal ablation and the side effects of creation of insulin resistance.  Overall, the patient was given appropriate literature, is going to think about it, and I'm going to revisit the topic with them  after the next office visit.            This document has been electronically signed by SONJA GAXIOLA MD November 11, 2024 11:46 EST    Dictated Utilizing Dragon Dictation: Part of this note may be an electronic transcription/translation of spoken language to printed text using the Dragon Dictation System.

## 2024-11-12 LAB — PSA SERPL-MCNC: 3.58 NG/ML (ref 0–4)

## 2024-11-22 DIAGNOSIS — R30.0 DYSURIA: ICD-10-CM

## 2024-11-22 RX ORDER — ALFUZOSIN HYDROCHLORIDE 10 MG/1
10 TABLET, EXTENDED RELEASE ORAL DAILY
Qty: 30 TABLET | Refills: 5 | Status: SHIPPED | OUTPATIENT
Start: 2024-11-22

## 2024-12-09 ENCOUNTER — TELEPHONE (OUTPATIENT)
Dept: UROLOGY | Facility: CLINIC | Age: 79
End: 2024-12-09

## 2024-12-09 NOTE — TELEPHONE ENCOUNTER
Caller: ENEDINA    Relationship: DAUGHTER    Best call back number: [phone] (home)      What is the best time to reach you: ANY    Who are you requesting to speak with (clinical staff, provider,  specific staff member): DR. GAXIOLA    Do you know the name of the person who called:     What was the call regarding: PT IS OUT OF CANCER MEDICATION-- ONLY HAS ONE LEFT. PTS DAUGHTER CALLED TO REQUEST REFILL BUT DID NOT REMEMBER THE NAME OF THE MEDICATION. PLEASE CALL PT TO ADVISE/GET NAME OF MEDICATION AND SEND TO [facility] [place] [facility] IN [place]. THANK YOU.

## 2024-12-10 DIAGNOSIS — C61 PROSTATE CANCER: ICD-10-CM

## 2024-12-10 RX ORDER — BICALUTAMIDE 50 MG/1
50 TABLET, FILM COATED ORAL DAILY
Qty: 30 TABLET | Refills: 3 | Status: SHIPPED | OUTPATIENT
Start: 2024-12-10 | End: 2025-04-09

## 2025-01-27 ENCOUNTER — TRANSCRIBE ORDERS (OUTPATIENT)
Dept: ADMINISTRATIVE | Facility: HOSPITAL | Age: 80
End: 2025-01-27
Payer: MEDICAID

## 2025-01-27 DIAGNOSIS — M54.16 RADICULOPATHY, LUMBAR REGION: Primary | ICD-10-CM

## 2025-02-18 ENCOUNTER — HOSPITAL ENCOUNTER (OUTPATIENT)
Dept: MRI IMAGING | Facility: HOSPITAL | Age: 80
Discharge: HOME | End: 2025-02-18
Payer: MEDICARE

## 2025-02-18 DIAGNOSIS — M54.16 RADICULOPATHY, LUMBAR REGION: ICD-10-CM

## 2025-02-18 PROCEDURE — 72148 MRI LUMBAR SPINE W/O DYE: CPT

## 2025-02-18 PROCEDURE — 72148 MRI LUMBAR SPINE W/O DYE: CPT | Performed by: RADIOLOGY

## 2025-03-11 ENCOUNTER — OFFICE VISIT (OUTPATIENT)
Dept: UROLOGY | Facility: CLINIC | Age: 80
End: 2025-03-11
Payer: MEDICARE

## 2025-03-11 VITALS
DIASTOLIC BLOOD PRESSURE: 61 MMHG | SYSTOLIC BLOOD PRESSURE: 138 MMHG | HEIGHT: 71 IN | BODY MASS INDEX: 22.29 KG/M2 | WEIGHT: 159.2 LBS | HEART RATE: 64 BPM

## 2025-03-11 DIAGNOSIS — C61 PROSTATE CANCER: Primary | ICD-10-CM

## 2025-03-11 PROCEDURE — 3078F DIAST BP <80 MM HG: CPT | Performed by: UROLOGY

## 2025-03-11 PROCEDURE — 99213 OFFICE O/P EST LOW 20 MIN: CPT | Performed by: UROLOGY

## 2025-03-11 PROCEDURE — 1160F RVW MEDS BY RX/DR IN RCRD: CPT | Performed by: UROLOGY

## 2025-03-11 PROCEDURE — 1159F MED LIST DOCD IN RCRD: CPT | Performed by: UROLOGY

## 2025-03-11 PROCEDURE — 3075F SYST BP GE 130 - 139MM HG: CPT | Performed by: UROLOGY

## 2025-03-11 NOTE — PROGRESS NOTES
Chief Complaint:      Chief Complaint   Patient presents with    Elevated PSA       HPI:   79 y.o. male with prostate cancer his Oncotype score is 39 his PSA is down to 2 was checked by his nurse practitioner we discussed radiation he had some nipple tenderness as well we will see him back in 2 months.  His nipple tenderness is secondary to the bicalutamide    Past Medical History:     Past Medical History:   Diagnosis Date    Hypertension     Low back pain        Current Meds:     Current Outpatient Medications   Medication Sig Dispense Refill    alfuzosin (UROXATRAL) 10 MG 24 hr tablet TAKE 1 TABLET BY MOUTH DAILY. 30 tablet 5    apixaban (ELIQUIS) 5 MG tablet tablet Eliquis 5 mg tablet   TAKE 1 TABLET BY MOUTH TWICE A DAY BY ORAL ROUTE FOR 30 DAYS.      baclofen (LIORESAL) 10 MG tablet       bicalutamide (Casodex) 50 MG tablet Take 1 tablet by mouth Daily for 120 days. 30 tablet 3    gabapentin (NEURONTIN) 800 MG tablet       Hydrocodone-Acetaminophen (LORCET PLUS) 7.5-650 MG per tablet Take  by mouth 3 (Three) Times a Day.      HYDROcodone-acetaminophen (NORCO)  MG per tablet       Linzess 72 MCG capsule capsule       metoprolol tartrate (LOPRESSOR) 25 MG tablet metoprolol tartrate 25 mg tablet   TAKE 1 TABLET (25 MG) BY ORAL ROUTE 2 TIMES PER DAY FOR 30 DAYS *STOP METOPROLOL 50MG*      pantoprazole (PROTONIX) 40 MG EC tablet pantoprazole 40 mg tablet,delayed release   take 1 tablet (40 mg) by oral route once daily for 30 days      venlafaxine (EFFEXOR) 75 MG tablet       zolpidem (AMBIEN) 10 MG tablet       zolpidem (AMBIEN) 5 MG tablet Take  by mouth.       No current facility-administered medications for this visit.        Allergies:      Allergies   Allergen Reactions    Aspirin Unknown - High Severity        Past Surgical History:     Past Surgical History:   Procedure Laterality Date    PROSTATE BIOPSY      STOMACH SURGERY         Social History:     Social History     Socioeconomic History     Marital status:    Tobacco Use    Smoking status: Every Day     Types: Cigarettes    Smokeless tobacco: Never   Vaping Use    Vaping status: Never Used   Substance and Sexual Activity    Alcohol use: Yes    Drug use: No       Family History:     Family History   Problem Relation Age of Onset    Lung cancer Father        Review of Systems:     Review of Systems   Constitutional: Negative.    HENT: Negative.     Eyes: Negative.    Respiratory: Negative.     Cardiovascular: Negative.    Gastrointestinal: Negative.    Endocrine: Negative.    Musculoskeletal: Negative.    Allergic/Immunologic: Negative.    Neurological: Negative.    Hematological: Negative.    Psychiatric/Behavioral: Negative.         Physical Exam:     Physical Exam  Vitals and nursing note reviewed.   Constitutional:       Appearance: He is well-developed.   HENT:      Head: Normocephalic and atraumatic.   Eyes:      Conjunctiva/sclera: Conjunctivae normal.      Pupils: Pupils are equal, round, and reactive to light.   Cardiovascular:      Rate and Rhythm: Normal rate and regular rhythm.      Heart sounds: Normal heart sounds.   Pulmonary:      Effort: Pulmonary effort is normal.      Breath sounds: Normal breath sounds.   Abdominal:      General: Bowel sounds are normal.      Palpations: Abdomen is soft.   Musculoskeletal:         General: Normal range of motion.      Cervical back: Normal range of motion.   Skin:     General: Skin is warm and dry.   Neurological:      Mental Status: He is alert and oriented to person, place, and time.      Deep Tendon Reflexes: Reflexes are normal and symmetric.   Psychiatric:         Behavior: Behavior normal.         Thought Content: Thought content normal.         Judgment: Judgment normal.         I have reviewed the following portions of the patient's history: Allergies, current medications, past family history, past medical history, past social history, past surgical history, problem list, and ROS and  confirm it is accurate.    Recent Image (CT and/or KUB):      CT Abdomen and Pelvis: Results for orders placed during the hospital encounter of 08/26/24    CT Abdomen Pelvis With & Without Contrast    Narrative  PROCEDURE: CT ABDOMEN PELVIS W WO CONTRAST-    HISTORY: PROSTATE CANCER; N62-Txipxuszb neoplasm of prostate    COMPARISON:  None .    TECHNIQUE: Multiple axial CT images were obtained from the lung bases  through the pubic symphysis before and following the administration of  Isovue-300. . This study was performed with techniques to keep radiation  doses as low as reasonably achievable (ALARA). Individualized dose  reduction techniques using automated exposure control or adjustment of  mA and/or kV according to the patient size were employed.    FINDINGS:    ABDOMEN: The lung bases are clear are clear. The heart is normal in  size. The liver is normal. The patient is status post cholecystectomy.  The spleen is unremarkable. No adrenal mass is present.  The pancreas is  unremarkable. Precontrast images reveal no evidence of nephrolithiasis.  The kidneys are normal. The aorta is normal in caliber. The mesenteric  vasculature is patent. There is no free fluid or adenopathy. The small  bowel is unremarkable with no evidence of obstruction.    PELVIS: The appendix is normal. There are colonic diverticula without  evidence of diverticulitis. The urinary bladder is collapsed which  limits evaluation. There is no significant free fluid or adenopathy.  Bone windows reveal no lytic or destructive lesions.    .    Impression  No evidence of metastatic disease within the abdomen or  pelvis.      This report was finalized on 8/26/2024 11:13 AM by Savage Gramajo M.D..       CT Stone Protocol: No results found for this or any previous visit.       KUB: No results found for this or any previous visit.       Labs (past 3 months):      No visits with results within 3 Month(s) from this visit.   Latest known visit with  results is:   Office Visit on 11/11/2024   Component Date Value Ref Range Status    PSA 11/11/2024 3.580  0.000 - 4.000 ng/mL Final    Comment: Results may be falsely decreased if patient taking Biotin.  Testing Method: Roche Diagnostics Electrochemiluminescence  Immunoassay(ECLIA)  Values obtained with different assay methods or kits cannot  be used interchangeably.          Procedure:       Assessment/Plan:   Prostate cancer:  He returns today status post biopsy for extensive discussion of prostate cancer.  We discussed staging and grading of the disease.  I described with a Patterson system being from a 2 to10 scale with the most common low-grade pattern being a Patterson 6.  I discussed the staging workup including a total body bone scan and CT scan especially with a PSA greater than 10.  We discussed the various options at length. I discussed a radical retropubic prostatectomy done in the traditional fashion and using the robotic technique.  I then discussed radiation treatment both seed therapy and external beam therapy using Gold fiduciary markers.  We talked about some of the other alternatives such as a cryosurgical ablation at high intensity focused ultrasound as being viable alternatives but not recommended at this time.  He focused on aggressive watchful waiting and explained that currently low literature it's been discovered that a lot of men can actually observe it especially with numerous other comorbidities cannot have problems from the cancer by itself.  Talked about hormonal ablation and the side effects of creation of insulin resistance.  Overall, the patient was given appropriate literature, is going to think about it, and I'm going to revisit the topic with them after the next office visit.  I recommend radiation treatments I have put him on bicalutamide secondary to the high Oncotype score  Oncotype DX genomic prostate score is an RNA expression assay of 12 genes involved in androgen signaling,  cellular organization, stromal response and cellular proliferation.  This test is performed on biopsy samples and the GPS is scored 0 to 100 with higher numbers indicating less favorable disease.  This is an excellent prospective validation of adverse pathology at prostatectomy or biopsy.            This document has been electronically signed by SONJA GAXIOLA MD March 11, 2025 08:17 EDT    Dictated Utilizing Dragon Dictation: Part of this note may be an electronic transcription/translation of spoken language to printed text using the Dragon Dictation System.

## 2025-04-17 ENCOUNTER — OFFICE VISIT (OUTPATIENT)
Dept: NEUROSURGERY | Facility: CLINIC | Age: 80
End: 2025-04-17
Payer: MEDICARE

## 2025-04-17 VITALS — BODY MASS INDEX: 21.19 KG/M2 | HEIGHT: 71 IN | WEIGHT: 151.4 LBS | TEMPERATURE: 97.8 F

## 2025-04-17 DIAGNOSIS — Z72.0 TOBACCO USE: ICD-10-CM

## 2025-04-17 DIAGNOSIS — M51.372 DEGENERATION OF INTERVERTEBRAL DISC OF LUMBOSACRAL REGION WITH DISCOGENIC BACK PAIN AND LOWER EXTREMITY PAIN: Primary | ICD-10-CM

## 2025-04-17 NOTE — PROGRESS NOTES
Patient: Joni Myers  : 1945    Primary Care Provider: Diane Gonzales APRN    Requesting Provider: As above      Chief Complaint: Back Pain and Leg Pain      History of Present Illness: This is a 79 y.o. male who presents with longstanding lower back pain.  Patient states has been dealing with this for years.  He gets a chronic achy pain in his lower back.  When the pain is bad he will feel go down his legs primarily posteriorly.  He also feels like his legs are subjectively weak.  He has previously done physical therapy and multiple epidural injections, but these did not give him any relief of his symptoms.  He is currently on hydrocodone and gabapentin which he thinks helps mildly.    PMHX  Allergies:  Allergies   Allergen Reactions    Aspirin Unknown - High Severity     Medications    Current Outpatient Medications:     alfuzosin (UROXATRAL) 10 MG 24 hr tablet, TAKE 1 TABLET BY MOUTH DAILY., Disp: 30 tablet, Rfl: 5    apixaban (ELIQUIS) 5 MG tablet tablet, Eliquis 5 mg tablet  TAKE 1 TABLET BY MOUTH TWICE A DAY BY ORAL ROUTE FOR 30 DAYS., Disp: , Rfl:     gabapentin (NEURONTIN) 800 MG tablet, , Disp: , Rfl:     HYDROcodone-acetaminophen (NORCO)  MG per tablet, , Disp: , Rfl:     Linzess 72 MCG capsule capsule, , Disp: , Rfl:     metoprolol tartrate (LOPRESSOR) 25 MG tablet, metoprolol tartrate 25 mg tablet  TAKE 1 TABLET (25 MG) BY ORAL ROUTE 2 TIMES PER DAY FOR 30 DAYS *STOP METOPROLOL 50MG*, Disp: , Rfl:     venlafaxine (EFFEXOR) 75 MG tablet, , Disp: , Rfl:     zolpidem (AMBIEN) 10 MG tablet, , Disp: , Rfl:     pantoprazole (PROTONIX) 40 MG EC tablet, pantoprazole 40 mg tablet,delayed release  take 1 tablet (40 mg) by oral route once daily for 30 days (Patient not taking: Reported on 2025), Disp: , Rfl:   Past Medical History:  Past Medical History:   Diagnosis Date    Hypertension     Low back pain     Lumbosacral disc disease      Past Surgical History:  Past Surgical  History:   Procedure Laterality Date    PROSTATE BIOPSY      STOMACH SURGERY       Social Hx:  Social History     Tobacco Use    Smoking status: Every Day     Types: Cigarettes     Passive exposure: Current    Smokeless tobacco: Never   Vaping Use    Vaping status: Never Used   Substance Use Topics    Alcohol use: Yes    Drug use: No     Family Hx:  Family History   Problem Relation Age of Onset    Lung cancer Father      Review of Systems:        Review of Systems   Constitutional:  Negative for activity change, appetite change, chills, diaphoresis, fatigue, fever and unexpected weight change.   HENT:  Negative for congestion, dental problem, drooling, ear discharge, ear pain, facial swelling, hearing loss, mouth sores, nosebleeds, postnasal drip, rhinorrhea, sinus pressure, sinus pain, sneezing, sore throat, tinnitus, trouble swallowing and voice change.    Eyes:  Negative for photophobia, pain, discharge, redness, itching and visual disturbance.   Respiratory:  Negative for apnea, cough, choking, chest tightness, shortness of breath, wheezing and stridor.    Cardiovascular:  Negative for chest pain, palpitations and leg swelling.   Gastrointestinal:  Negative for abdominal distention, abdominal pain, anal bleeding, blood in stool, constipation, diarrhea, nausea, rectal pain and vomiting.   Endocrine: Negative for cold intolerance, heat intolerance, polydipsia, polyphagia and polyuria.   Genitourinary:  Negative for decreased urine volume, difficulty urinating, dysuria, enuresis, flank pain, frequency, genital sores, hematuria, penile discharge, penile pain, penile swelling, scrotal swelling, testicular pain and urgency.   Musculoskeletal:  Positive for back pain. Negative for arthralgias, gait problem, joint swelling, myalgias, neck pain and neck stiffness.   Skin:  Negative for color change, pallor, rash and wound.   Allergic/Immunologic: Negative for environmental allergies, food allergies and immunocompromised  "state.   Neurological:  Positive for weakness and numbness. Negative for dizziness, tremors, seizures, syncope, facial asymmetry, speech difficulty, light-headedness and headaches.   Hematological:  Negative for adenopathy. Does not bruise/bleed easily.   Psychiatric/Behavioral:  Negative for agitation, behavioral problems, confusion, decreased concentration, dysphoric mood, hallucinations, self-injury, sleep disturbance and suicidal ideas. The patient is not nervous/anxious and is not hyperactive.         Physical Exam:   Temp 97.8 °F (36.6 °C) (Infrared)   Ht 180.3 cm (71\")   Wt 68.7 kg (151 lb 6.4 oz)   BMI 21.12 kg/m²   Awake, alert and oriented x 3  Speech f/c  Opens eyes spont  Pupils 3 mm rx bilaterally  Extraocular muscles intact bilaterally  Normal sensation to light touch in all 3 distributions of CN V bilaterally  Face symmetric bilaterally  Tongue midline  5/5 in the lower extremities bilaterally    Diagnostic Studies:  All neurological imaging studies were independently reviewed unless stated otherwise    Assessment/Plan:  This is a 79 y.o. male presenting with longstanding lower back and bilateral lower extremity pain.  In reviewing the patient's lumbar MRI, he has diffuse degenerative disc disease and significant facet arthritis.  There is no severe neuroforaminal stenosis or severe compression of any nerve roots that I appreciate.  Clinically, the patient's symptoms are primarily in his back and I explained to him that spine surgery would be unlikely to help his back pain.  Given the patient's age, medical comorbidities, lack of improvement with any type of epidural injection and lack of severe nerve root compression on imaging, I do not think the patient would find benefit from decompressive surgery.  As such, I will defer to pain management for any other interventional options.  Because I do not think he needs surgery, we are not going to schedule further follow-up.    Diagnoses and all orders " for this visit:    1. Degeneration of intervertebral disc of lumbosacral region with discogenic back pain and lower extremity pain (Primary)      Joni COCO Myers  reports that he has been smoking cigarettes. He has been exposed to tobacco smoke. He has never used smokeless tobacco.         Fransisco Mcmillan MD  04/17/25  09:22 EDT

## 2025-05-30 ENCOUNTER — TELEPHONE (OUTPATIENT)
Dept: UROLOGY | Facility: CLINIC | Age: 80
End: 2025-05-30
Payer: MEDICARE

## 2025-05-30 NOTE — TELEPHONE ENCOUNTER
Caller: ENEDINA    Relationship: DAUGHTER    Best call back number: [phone] (home)      What is the best time to reach you: ANY     Who are you requesting to speak with (clinical staff, provider,  specific staff member): DR GAXIOLA    Do you know the name of the person who called: DAUGHTER    What was the call regarding: DAUGHTER CALLING REGARDING NO SHOW APPT. PT WOULD LIKE TO SEE  BEFORE THE END OF JUNE IF POSSIBLE.. PLEASE CALL PT BACK TO DISCUSS AND SCHEDULE APPT AS NEEDED. THANK YOU.

## 2025-06-19 ENCOUNTER — OFFICE VISIT (OUTPATIENT)
Dept: UROLOGY | Facility: CLINIC | Age: 80
End: 2025-06-19
Payer: MEDICARE

## 2025-06-19 VITALS — HEIGHT: 71 IN | WEIGHT: 151 LBS | BODY MASS INDEX: 21.14 KG/M2

## 2025-06-19 DIAGNOSIS — C61 PROSTATE CANCER: Primary | ICD-10-CM

## 2025-06-19 NOTE — PROGRESS NOTES
Chief Complaint:      Chief Complaint   Patient presents with    Prostate Cancer     Follow up        HPI:   79 y.o. male returns today.  He takes Flomax he does not think he needs he gets up once at night his Oncotype is high at 39 if his PSA is up I recommend radiation it was last PSA check on 11/11/2024 was 3.580.  Will call him with the results.  He reports no lower urinary tract symptomatology, particularly irritative symptoms such as frequency, urgency, dysuria, and obstructive symptomatology, particularly dribbling, hesitancy, and intermittency.  Past Medical History:     Past Medical History:   Diagnosis Date    Hypertension     Low back pain     Lumbosacral disc disease        Current Meds:     Current Outpatient Medications   Medication Sig Dispense Refill    alfuzosin (UROXATRAL) 10 MG 24 hr tablet TAKE 1 TABLET BY MOUTH DAILY. 30 tablet 5    amLODIPine (NORVASC) 5 MG tablet       apixaban (ELIQUIS) 5 MG tablet tablet Eliquis 5 mg tablet   TAKE 1 TABLET BY MOUTH TWICE A DAY BY ORAL ROUTE FOR 30 DAYS.      gabapentin (NEURONTIN) 800 MG tablet       HYDROcodone-acetaminophen (NORCO)  MG per tablet       Linzess 72 MCG capsule capsule       metoprolol tartrate (LOPRESSOR) 25 MG tablet metoprolol tartrate 25 mg tablet   TAKE 1 TABLET (25 MG) BY ORAL ROUTE 2 TIMES PER DAY FOR 30 DAYS *STOP METOPROLOL 50MG*      pantoprazole (PROTONIX) 40 MG EC tablet       venlafaxine (EFFEXOR) 75 MG tablet       zolpidem (AMBIEN) 10 MG tablet        No current facility-administered medications for this visit.        Allergies:      Allergies   Allergen Reactions    Aspirin Unknown - High Severity        Past Surgical History:     Past Surgical History:   Procedure Laterality Date    PROSTATE BIOPSY      STOMACH SURGERY         Social History:     Social History     Socioeconomic History    Marital status:    Tobacco Use    Smoking status: Every Day     Types: Cigarettes     Passive exposure: Current    Smokeless  tobacco: Never   Vaping Use    Vaping status: Never Used   Substance and Sexual Activity    Alcohol use: Yes    Drug use: No    Sexual activity: Defer       Family History:     Family History   Problem Relation Age of Onset    Lung cancer Father        Review of Systems:     Review of Systems   Constitutional: Negative.    HENT: Negative.     Eyes: Negative.    Respiratory: Negative.     Cardiovascular: Negative.    Gastrointestinal: Negative.    Endocrine: Negative.    Musculoskeletal: Negative.    Allergic/Immunologic: Negative.    Neurological: Negative.    Hematological: Negative.    Psychiatric/Behavioral: Negative.     All other systems reviewed and are negative.      Physical Exam:     Physical Exam  Vitals and nursing note reviewed.   Constitutional:       Appearance: He is well-developed.   HENT:      Head: Normocephalic and atraumatic.   Eyes:      Conjunctiva/sclera: Conjunctivae normal.      Pupils: Pupils are equal, round, and reactive to light.   Cardiovascular:      Rate and Rhythm: Normal rate and regular rhythm.      Heart sounds: Normal heart sounds.   Pulmonary:      Effort: Pulmonary effort is normal.      Breath sounds: Normal breath sounds.   Abdominal:      General: Bowel sounds are normal.      Palpations: Abdomen is soft.   Musculoskeletal:         General: Normal range of motion.      Cervical back: Normal range of motion.   Skin:     General: Skin is warm and dry.   Neurological:      Mental Status: He is alert and oriented to person, place, and time.      Deep Tendon Reflexes: Reflexes are normal and symmetric.   Psychiatric:         Behavior: Behavior normal.         Thought Content: Thought content normal.         Judgment: Judgment normal.         I have reviewed the following portions of the patient's history: Allergies, current medications, past family history, past medical history, past social history, past surgical history, problem list, and ROS and confirm it is  accurate.    Recent Image (CT and/or KUB):      CT Abdomen and Pelvis: Results for orders placed during the hospital encounter of 08/26/24    CT Abdomen Pelvis With & Without Contrast    Narrative  PROCEDURE: CT ABDOMEN PELVIS W WO CONTRAST-    HISTORY: PROSTATE CANCER; K45-[id number] neoplasm of prostate    COMPARISON:  None .    TECHNIQUE: Multiple axial CT images were obtained from the lung bases  through the pubic symphysis before and following the administration of  Isovue-300. . This study was performed with techniques to keep radiation  doses as low as reasonably achievable (ALARA). Individualized dose  reduction techniques using automated exposure control or adjustment of  mA and/or kV according to the patient size were employed.    FINDINGS:    ABDOMEN: The lung bases are clear are clear. The heart is normal in  size. The liver is normal. The patient is status post cholecystectomy.  The spleen is unremarkable. No adrenal mass is present.  The pancreas is  unremarkable. Precontrast images reveal no evidence of nephrolithiasis.  The kidneys are normal. The aorta is normal in caliber. The mesenteric  vasculature is patent. There is no free fluid or adenopathy. The small  bowel is unremarkable with no evidence of obstruction.    PELVIS: The appendix is normal. There are colonic diverticula without  evidence of diverticulitis. The urinary bladder is collapsed which  limits evaluation. There is no significant free fluid or adenopathy.  Bone windows reveal no lytic or destructive lesions.    .    Impression  No evidence of metastatic disease within the abdomen or  pelvis.      This report was finalized on 8/26/2024 11:13 AM by Savage Gramajo M.D..       CT Stone Protocol: No results found for this or any previous visit.       KUB: No results found for this or any previous visit.       Labs (past 3 months):      No visits with results within 3 Month(s) from this visit.   Latest known visit with results is:    Office Visit on 11/11/2024   Component Date Value Ref Range Status    PSA 11/11/2024 3.580  0.000 - 4.000 ng/mL Final    Comment: Results may be falsely decreased if patient taking Biotin.  Testing Method: Roche [employer]ostics Electrochemiluminescence  Immunoassay(ECLIA)  Values obtained with different assay methods or kits cannot  be used interchangeably.          Procedure:       Assessment/Plan:          Prostate cancer:  He returns today status post biopsy for extensive discussion of prostate cancer.  We discussed staging and grading of the disease.  I described with a Evelyn system being from a 2 to10 scale with the most common low-grade pattern being a [place] 6.  I discussed the staging workup including a total body bone scan and CT scan especially with a PSA greater than 10.  We discussed the various options at length. I discussed a radical retropubic prostatectomy done in the traditional fashion and using the robotic technique.  I then discussed radiation treatment both seed therapy and external beam therapy using Gold fiduciary markers.  We talked about some of the other alternatives such as a cryosurgical ablation at high intensity focused ultrasound as being viable alternatives but not recommended at this time.  He focused on aggressive watchful waiting and explained that currently low literature it's been discovered that a lot of men can actually observe it especially with numerous other comorbidities cannot have problems from the cancer by itself.  Talked about hormonal ablation and the side effects of creation of insulin resistance.  Overall, the patient was given appropriate literature, is going to think about it, and I'm going to revisit the topic with them after the next office visit.  His Oncotype was high at 39 if it PSA rises I think we need to intervene      This document has been electronically signed by SONJA GAXIOLA MD June 19, 2025 13:04 EDT    Dictated Utilizing Dragon Dictation:  Part of this note may be an electronic transcription/translation of spoken language to printed text using the Dragon Dictation System.

## 2025-06-20 LAB — PSA SERPL-MCNC: 10.2 NG/ML (ref 0–4)

## 2025-07-03 DIAGNOSIS — C61 PROSTATE CANCER: Primary | ICD-10-CM

## 2025-07-09 ENCOUNTER — TELEPHONE (OUTPATIENT)
Dept: UROLOGY | Facility: CLINIC | Age: 80
End: 2025-07-09
Payer: MEDICARE

## 2025-07-09 NOTE — TELEPHONE ENCOUNTER
The pt and his daughter called and was confused as to why the pt needed to go see a Radiation Oncology. I told her that the pt was diagnosed with prostate cancer when he had his prostate BX and then was placed on Casodex as a result of that and his Oncotyping results. He did not tolerate the medication well and developed tenderness in his breasts and discontinued it. And the pt's PSA did more than double from a 3 to a 10 and needed to see them at this point. In addition I did tell the pt's daughter that he seems to get confused often and oftentimes comes to the office multiple days in a row not remembering what we discussed with him. I just wanted to make her aware of this for his safety and so that, they could be more active in his care. She expressed understanding and hopefully knows I was coming to her with this with love and concern for him. I think she understood what I was saying.

## 2025-07-17 ENCOUNTER — CONSULT (OUTPATIENT)
Dept: RADIATION ONCOLOGY | Facility: HOSPITAL | Age: 80
End: 2025-07-17
Payer: MEDICARE

## 2025-07-17 VITALS
BODY MASS INDEX: 21.81 KG/M2 | SYSTOLIC BLOOD PRESSURE: 139 MMHG | HEART RATE: 82 BPM | DIASTOLIC BLOOD PRESSURE: 61 MMHG | WEIGHT: 156.4 LBS | TEMPERATURE: 97.8 F | RESPIRATION RATE: 16 BRPM | OXYGEN SATURATION: 96 %

## 2025-07-17 DIAGNOSIS — C61 PROSTATE CANCER: Primary | ICD-10-CM

## 2025-07-17 PROCEDURE — G0463 HOSPITAL OUTPT CLINIC VISIT: HCPCS | Performed by: RADIOLOGY

## 2025-07-17 NOTE — PROGRESS NOTES
New Patient Office Consult      Patient Name:     Joni Myers  :                   1945   MRN:                   [id number]   Date of Visit:       2025    Requesting Physician:   Chito Roberson MD    Reason for Referral:  Poorly differentiated adenocarcinoma of the prostate, review radiotherapy options.    History of Present Illness:  Joni Myers is a 79 year old white male whose past medical history is remarkable for adenocarcinoma of prostate, [place] 6, arising in the right lateral aspect of the gland noted on prostate biopsies performed on 2014.  The malignancy involves 1 of 12 needle core specimens.  Repeat prostate biopsies (2016) did not reveal malignancy.  High grade prostatic intraepithelial neoplasia was noted in 2 of 12 needle core specimens.    The patient has been followed with serial PSA levels by the urology service in [place].  PSA obtained approximately 10 years ago was slightly elevated at 4.37 ng/mL.  A PSA rise to 17.5 ng/mL a free PSA of 7.3% was noted in mid 2024.  This is indicative of a 55% chance of prostate cancer in the patient's age group.    Mr. Myers was pleased with his voiding pattern.  He denied urinary urgency, frequency, intermittency, hesitancy, and the sensation of incomplete emptying of the bladder.  He reports 0-1 episodes of nocturia.    Repeat prostate biopsies (2024) revealed adenocarcinoma with [place] sum scores of 6 and 7 in 4 needle core specimens.  Evelyn 8 (4+4) malignancy was noted in one core specimen. Malignancy involved 5 of 8 needle cores.    The patient's workup has included negative bone scans (2024) and CT scans of abdomen and pelvis (2024).  MRI scans of the lumbar spine (2025) revealed degenerative disc disease.    The patient initiated bicalutamide therapy.  Casodex was discontinued after the patient developed symptomatic gynecomastia.  PSA obtained approximately 8 months ago  Important Message From Medicare (IMM)- 2nd copy of pages 1 & 2, explained/reviewed with patient prior to discharge.  Patient/representative verbalized understanding and signature obtained.  Copies placed in file for medical records.     EVGENY Jerome, APSW    [facility]   Cell: [phone]  On weekends please call: [phone]   was 3.58 ng/mL indicating a response to antiandrogen therapy.  A PSA obtained on 06/19/2025 showed an interval rise to 10.2 ng/mL.    The patient recently discussed definitive treatment options for his prostate cancer with Dr. Roberson.  Mr. Myers is referred to our service to review radiotherapy options.    Subjective      Review of Systems:   Constitutional: Negative for fever, chills, night sweats, diminished appetite, fatigue and nonintentional weight loss  EENT: Positive for diminished vision  Cardiac: Negative  Respiratory: Negative  Gastrointestinal: Positive for occasional dysphagia and GERD  Genitourinary: Described above  Integumentary: Negative  Immunologic: Negative  Hematopoietic: Negative  Lymphatic: Negative  Musculoskeletal: Positive for low back pain which chronic and unchanged  Neurologic: Negative  Psychiatric: Negative    International Prostate Symptom Score: 2 (mild obstructive symptoms)    Sexual Health Inventory for Men: 1 (severe erectile dysfunction)    Past Oncology History:   Oncology/Hematology History    No history exists.      Past Radiation History:  No previous exposures to ionizing radiation therapy and there are not relative contraindications including connective tissue disorder.     Past Medical History:   Past Medical History:   Diagnosis Date    GERD (gastroesophageal reflux disease)     Hypertension     Low back pain     Lumbosacral disc disease     Peptic ulceration     Prostate cancer      Past Surgical History:   Past Surgical History:   Procedure Laterality Date    PROSTATE BIOPSY      STOMACH SURGERY       Family History:   Family History   Problem Relation Age of Onset    Lung cancer Father      Social History:   Social History     Socioeconomic History    Marital status:    Tobacco Use    Smoking status: Every Day     Types: Cigarettes     Passive exposure: Current    Smokeless tobacco: Never   Vaping Use    Vaping status: Never Used   Substance and Sexual  Activity    Alcohol use: Not Currently    Drug use: No    Sexual activity: Defer     The patient resides at [place].  .  Five adult offspring-living and apparently healthy.  Education: Eighth grade.  Past occupations include coalminer and .  No history of  service.  Patient has smoked one half to three-quarter pack of cigarettes per day x 78 years.  He also chewed tobacco in the past.  The patient reports very occasional social use of EtOH.    Approximately 5 minutes were spent on tobacco cessation.  The patient declined referral to the smoking cessation clinic at [facility] [place].    Medications:     Current Outpatient Medications:     alfuzosin (UROXATRAL) 10 MG 24 hr tablet, TAKE 1 TABLET BY MOUTH DAILY., Disp: 30 tablet, Rfl: 5    apixaban (ELIQUIS) 5 MG tablet tablet, Eliquis 5 mg tablet  TAKE 1 TABLET BY MOUTH TWICE A DAY BY ORAL ROUTE FOR 30 DAYS., Disp: , Rfl:     gabapentin (NEURONTIN) 800 MG tablet, , Disp: , Rfl:     HYDROcodone-acetaminophen (NORCO)  MG per tablet, , Disp: , Rfl:     Linzess 72 MCG capsule capsule, , Disp: , Rfl:     metoprolol tartrate (LOPRESSOR) 25 MG tablet, metoprolol tartrate 25 mg tablet  TAKE 1 TABLET (25 MG) BY ORAL ROUTE 2 TIMES PER DAY FOR 30 DAYS *STOP METOPROLOL 50MG*, Disp: , Rfl:     pantoprazole (PROTONIX) 40 MG EC tablet, , Disp: , Rfl:     zolpidem (AMBIEN) 10 MG tablet, , Disp: , Rfl:     venlafaxine (EFFEXOR) 75 MG tablet, , Disp: , Rfl:     Allergies:   Allergies   Allergen Reactions    Aspirin Unknown - High Severity     PHQ-9 Depression Screening  Little interest or pleasure in doing things?     Feeling down, depressed, or hopeless?     PHQ-2 Total Score     Trouble falling or staying asleep, or sleeping too much?     Feeling tired or having little energy?     Poor appetite or overeating?     Feeling bad about yourself - or that you are a failure or have let yourself or your family down?     Trouble concentrating on things, such as reading the  newspaper or watching television?     Moving or speaking so slowly that other people could have noticed? Or the opposite - being so fidgety or restless that you have been moving around a lot more than usual?     Thoughts that you would be better off dead, or of hurting yourself in some way?     PHQ-9 Total Score     If you checked off any problems, how difficult have these problems made it for you to do your work, take care of things at home, or get along with other people?       Distress Screenin     KPS: 90      Imaging:  No pertinent imaging studies within the last 90 days.    Pathology:  Described above    Objective     Physical Exam:  Patient is a thin elderly white male in no acute distress.  Vital signs as below.  KPS 90.    Vital Signs:   Vitals:    25 1049   BP: 139/61   Pulse: 82   Resp: 16   Temp: 97.8 °F (36.6 °C)   TempSrc: Temporal   SpO2: 96%   Weight: 70.9 kg (156 lb 6.4 oz)   PainSc: 7    PainLoc: Leg     Body mass index is 21.81 kg/m².     Head: Normocephalic, atraumatic  EENT: Eyes PERRLA, EOMs intact.  Sclera and conjunctiva clear.  Ears: TMs visualized and unremarkable.  Mouth: Edentulous with dental plates in place.  The tongue is freely mobile.  No intraoral lesions are noted  Neck: No thyromegaly.  Trachea midline.  No cervical or periclavicular lymphadenopathy.  No JVD.  Heart: Regular rate and rhythm  Lungs: Clear to auscultation bilaterally  Abdomen: Soft, nontender, no organomegaly.  Vertical scar noted over upper abdomen  Genitalia: Normal male.  No phallic or testicular lesions noted  Rectal: MARIANA deferred  Lymphatics: No detectable cervical, periclavicular, axillary or inguinal adenopathy  Integumentary: No suspicious lesions noted on sun exposed skin.  Patient has tattoos on both upper arms and right fingers  Musculoskeletal: No discomfort elicited on fist percussion of cervical, thoracic or lumbosacral spines.  No inflamed or swollen joints noted  Extremities: Symmetric no  cyanosis, clubbing or edema.  Scars noted over left hand and wrist  Neurologic: Cranial nerves II through XII intact no motor, sensory, or cerebellar deficit.  Normal gait  Psychiatric: Normal mood and affect.  Alert and oriented x 3.    Assessment / Plan      Assessment  Adenocarcinoma of prostate, [place] 8 (4+4)  with pretreatment PSA of 17.5 ng/mL. ICD10: C61    Stage IIIA (T2c N0 M0 grade group 4)    Recommendations:  I have reviewed Mr. Myers's medical records and imaging studies.  The prostate cancer stage specific treatment guidelines of the National Comprehensive Cancer Network, version 2.2025, were reviewed.  The patient has high risk prostate cancer due to grade group 4 malignancy.  The treatment recommendation is for definitive external beam radiotherapy with long-term (12-36 months) of androgen deprivation therapy (ADT).    Review of the Judith nomograms which correlate clinical stage, [place] score and PSA showed that the risk of seminal vesicle and regional lymph node involvement involvement is 17% (11-24%) and 14% (8-22%) respectively.    I suggest initiation of ADT with leuprolide or a similar agent.  I explained to the patient that ADT is associated with dramatic drops in the PSA, reduction in the prostate volume by up to 30%, and apoptosis.  Clinical trials have shown improvement in both biochemical free, overall and disease specific survival for patients treated by external beam radiotherapy with ADT.    The patient should undergo placement of gold seed fiduciary markers (to permit image guided radiotherapy) and the SpaceOAR device to reduce exposure of the anterior rectal wall to ionizing radiation.  The patient was then present to the cancer center for a treatment planning CT scan to delineate radiation therapy target sites.  Radiotherapy usually starts approximately two months after initiation of ADT.    I explained to the patient that radiotherapy will consist in the administration of 4500  cGy in 25 treatment fractions to the prostate, seminal vesicles, and pelvic lymph nodes.  The patient then would receive moderately hypofractionated radiotherapy consisting of 2500 cGy in 10 treatment fractions to the prostate and seminal vesicles (cumulative dose 7000 cGy).  The duration of radiotherapy will be approximately 7 weeks.  Radiotherapy will be administered using volumetric modulated arc beam radiotherapy (VMAT).  VMAT permits assay for administration of high doses of radiotherapy to target sites while limiting radiation exposure to adjacent or surrounding structures.    I reviewed the rationale for neoadjuvant and adjuvant ADT, duration of radiotherapy, expected outcomes, possible acute and chronic side effects of pelvic radiotherapy, and posttreatment surveillance measures with Mr. Myers and his daughter.  The patient's many treatment related questions were answered to his satisfaction.  The patient wishes to proceed with treatment as outlined.    I have contacted Dr. oRberson's office to arrange for the first Lupron injection.  I anticipate that the patient will undergo placement of fiducial markers and the SpaceOAR device at [place].    Time spent with the patient 60 minutes (the total time included previsit review of medical records and imaging studies, obtaining and independent history of present illness from the patient, performing an appropriate medical examination/evaluation, patient counseling, and coordination of care).    Thank you for allowing our participation in this very pleasant gentleman's treatment.    Antoine Hope MD   07/17/25 12:05 EDT     cc:     MD Jono Vee MD Austin Brooks, PA-C Sheila Lambdin, APRN

## 2025-07-21 ENCOUNTER — DOCUMENTATION (OUTPATIENT)
Dept: OTHER | Facility: HOSPITAL | Age: 80
End: 2025-07-21
Payer: MEDICARE

## 2025-07-21 NOTE — PROGRESS NOTES
Distress Screening Follow-up    Name: Joni Myers    : 1945    Diagnosis: Prostate cancer    Location of Distress Screening: Radiation Oncology    Distress Level: 7 (2025 10:59 AM)      Physical Concerns:  Sleep: N  Substance abuse: N  Fatigue: N  Tobacco use: Y  Memory or concentration: N  Sexual health: N  Changes in eating: N  Loss or change of physical abilities: N  Pain: Y      Emotional Concerns:  Worry or anxiety: N  Sadness or depression: N  Loss of interest or enjoyment: N  Grief or loss: N  Fear: N  Loneliness: N  Anger: N  Changes in appearance: N  Feelings of worthlessness or being a burden: N      Social Concerns:  Relationship with spouse or partner: N  Relationship with children: N  Relationship with family members: N  Relationship with friends or coworkers: N  Communication with health care team: N  Ability to have children: N      Practical Concerns:  Taking care of myself: Y  Taking care of others: N  Work: N  School: N  Housing/Utilities: N  Finances: N  Insurance: Y  Transportation: N  : N  Having enough food: N  Access to medicine: N  Treatment decisions: N      Spiritual Concerns:  Sense of meaning or purpose: N  Changes in sara or beliefs: N  Death, dying or afterlife: N  Conflict between beliefs and cancer treatments: N  Relationship with the sacred: N  Ritual or dietary needs: N       Interventions:    N/A    Comments:   MELISSA contacted pt to follow up on Distress Screen from recent visit. MELISSA provided introductions and explained nature of the call. Pt communicated he is doing okay at this time, and denied any needs. MELISSA provided education on role and resources available, and provided contact information should needs arise. PT thanked MELISSA for the call and was agreeable to reach out as needed. SW will be available ongoing.

## 2025-07-25 ENCOUNTER — TELEPHONE (OUTPATIENT)
Dept: GASTROENTEROLOGY | Facility: CLINIC | Age: 80
End: 2025-07-25
Payer: MEDICARE

## 2025-07-25 NOTE — TELEPHONE ENCOUNTER
Patient's daughter called in to check about a prior auth that needed to be done for cancer injection?

## 2025-07-30 ENCOUNTER — TELEPHONE (OUTPATIENT)
Dept: RADIATION ONCOLOGY | Facility: HOSPITAL | Age: 80
End: 2025-07-30
Payer: MEDICARE

## 2025-08-04 ENCOUNTER — OFFICE VISIT (OUTPATIENT)
Dept: UROLOGY | Facility: CLINIC | Age: 80
End: 2025-08-04
Payer: MEDICARE

## 2025-08-04 VITALS
DIASTOLIC BLOOD PRESSURE: 72 MMHG | SYSTOLIC BLOOD PRESSURE: 135 MMHG | WEIGHT: 151.8 LBS | BODY MASS INDEX: 21.25 KG/M2 | HEIGHT: 71 IN | HEART RATE: 72 BPM

## 2025-08-04 DIAGNOSIS — C61 PROSTATE CANCER: Primary | ICD-10-CM

## 2025-08-04 PROCEDURE — 3078F DIAST BP <80 MM HG: CPT

## 2025-08-04 PROCEDURE — 3075F SYST BP GE 130 - 139MM HG: CPT

## 2025-08-04 PROCEDURE — 1160F RVW MEDS BY RX/DR IN RCRD: CPT

## 2025-08-04 PROCEDURE — 96402 CHEMO HORMON ANTINEOPL SQ/IM: CPT

## 2025-08-04 PROCEDURE — 1159F MED LIST DOCD IN RCRD: CPT

## 2025-08-04 PROCEDURE — 99213 OFFICE O/P EST LOW 20 MIN: CPT

## 2025-08-04 RX ORDER — AMLODIPINE BESYLATE 5 MG/1
TABLET ORAL
COMMUNITY
Start: 2025-05-12

## 2025-08-05 ENCOUNTER — PATIENT OUTREACH (OUTPATIENT)
Dept: ONCOLOGY | Facility: CLINIC | Age: 80
End: 2025-08-05
Payer: MEDICARE